# Patient Record
Sex: MALE | Race: WHITE | NOT HISPANIC OR LATINO | Employment: FULL TIME | URBAN - METROPOLITAN AREA
[De-identification: names, ages, dates, MRNs, and addresses within clinical notes are randomized per-mention and may not be internally consistent; named-entity substitution may affect disease eponyms.]

---

## 2017-01-30 ENCOUNTER — GENERIC CONVERSION - ENCOUNTER (OUTPATIENT)
Dept: OTHER | Facility: OTHER | Age: 42
End: 2017-01-30

## 2017-02-06 ENCOUNTER — ALLSCRIPTS OFFICE VISIT (OUTPATIENT)
Dept: OTHER | Facility: OTHER | Age: 42
End: 2017-02-06

## 2017-04-06 ENCOUNTER — LAB (OUTPATIENT)
Dept: LAB | Facility: MEDICAL CENTER | Age: 42
End: 2017-04-06
Attending: PHYSICIAN ASSISTANT

## 2017-04-06 ENCOUNTER — APPOINTMENT (OUTPATIENT)
Dept: URGENT CARE | Facility: MEDICAL CENTER | Age: 42
End: 2017-04-06

## 2017-04-06 ENCOUNTER — TRANSCRIBE ORDERS (OUTPATIENT)
Dept: URGENT CARE | Facility: MEDICAL CENTER | Age: 42
End: 2017-04-06

## 2017-04-06 ENCOUNTER — HOSPITAL ENCOUNTER (OUTPATIENT)
Dept: RADIOLOGY | Facility: MEDICAL CENTER | Age: 42
Discharge: HOME | End: 2017-04-06
Attending: PHYSICIAN ASSISTANT | Admitting: FAMILY MEDICINE

## 2017-04-06 DIAGNOSIS — Z00.8 SPECIAL EXAM: ICD-10-CM

## 2017-04-06 DIAGNOSIS — Z00.8 SPECIAL EXAM: Primary | ICD-10-CM

## 2017-04-06 LAB
ALBUMIN SERPL BCP-MCNC: 3.8 G/DL (ref 3.5–5)
ALP SERPL-CCNC: 66 U/L (ref 46–116)
ALT SERPL W P-5'-P-CCNC: 32 U/L (ref 12–78)
ANION GAP SERPL CALCULATED.3IONS-SCNC: 6 MMOL/L (ref 4–13)
AST SERPL W P-5'-P-CCNC: 16 U/L (ref 5–45)
ATRIAL RATE: 68 BPM
BASOPHILS # BLD AUTO: 0.02 THOUSANDS/ΜL (ref 0–0.1)
BASOPHILS NFR BLD AUTO: 0 % (ref 0–1)
BILIRUB SERPL-MCNC: 0.7 MG/DL (ref 0.2–1)
BUN SERPL-MCNC: 11 MG/DL (ref 5–25)
CALCIUM SERPL-MCNC: 8.8 MG/DL (ref 8.3–10.1)
CHLORIDE SERPL-SCNC: 107 MMOL/L (ref 100–108)
CHOLEST SERPL-MCNC: 155 MG/DL (ref 50–200)
CO2 SERPL-SCNC: 27 MMOL/L (ref 21–32)
CREAT SERPL-MCNC: 1.17 MG/DL (ref 0.6–1.3)
EOSINOPHIL # BLD AUTO: 0.09 THOUSAND/ΜL (ref 0–0.61)
EOSINOPHIL NFR BLD AUTO: 2 % (ref 0–6)
ERYTHROCYTE [DISTWIDTH] IN BLOOD BY AUTOMATED COUNT: 13.2 % (ref 11.6–15.1)
GFR SERPL CREATININE-BSD FRML MDRD: >60 ML/MIN/1.73SQ M
GLUCOSE SERPL-MCNC: 90 MG/DL (ref 65–140)
HCT VFR BLD AUTO: 43.5 % (ref 36.5–49.3)
HDLC SERPL-MCNC: 45 MG/DL (ref 40–60)
HGB BLD-MCNC: 15 G/DL (ref 12–17)
LDLC SERPL CALC-MCNC: 83 MG/DL (ref 0–100)
LYMPHOCYTES # BLD AUTO: 1.77 THOUSANDS/ΜL (ref 0.6–4.47)
LYMPHOCYTES NFR BLD AUTO: 39 % (ref 14–44)
MCH RBC QN AUTO: 30.6 PG (ref 26.8–34.3)
MCHC RBC AUTO-ENTMCNC: 34.5 G/DL (ref 31.4–37.4)
MCV RBC AUTO: 89 FL (ref 82–98)
MONOCYTES # BLD AUTO: 0.64 THOUSAND/ΜL (ref 0.17–1.22)
MONOCYTES NFR BLD AUTO: 14 % (ref 4–12)
NEUTROPHILS # BLD AUTO: 2 THOUSANDS/ΜL (ref 1.85–7.62)
NEUTS SEG NFR BLD AUTO: 45 % (ref 43–75)
NRBC BLD AUTO-RTO: 0 /100 WBCS
P AXIS: 34 DEGREES
PLATELET # BLD AUTO: 169 THOUSANDS/UL (ref 149–390)
PMV BLD AUTO: 11 FL (ref 8.9–12.7)
POTASSIUM SERPL-SCNC: 4.3 MMOL/L (ref 3.5–5.3)
PR INTERVAL: 144 MS
PROT SERPL-MCNC: 6.9 G/DL (ref 6.4–8.2)
QRS AXIS: 23 DEGREES
QRSD INTERVAL: 86 MS
QT INTERVAL: 416 MS
QTC INTERVAL: 442 MS
RBC # BLD AUTO: 4.9 MILLION/UL (ref 3.88–5.62)
SODIUM SERPL-SCNC: 140 MMOL/L (ref 136–145)
T WAVE AXIS: 28 DEGREES
TRIGL SERPL-MCNC: 135 MG/DL
VENTRICULAR RATE: 68 BPM
WBC # BLD AUTO: 4.54 THOUSAND/UL (ref 4.31–10.16)

## 2017-04-06 PROCEDURE — 93005 ELECTROCARDIOGRAM TRACING: CPT

## 2017-04-06 PROCEDURE — 36415 COLL VENOUS BLD VENIPUNCTURE: CPT

## 2017-04-06 PROCEDURE — 85025 COMPLETE CBC W/AUTO DIFF WBC: CPT

## 2017-04-06 PROCEDURE — 80061 LIPID PANEL: CPT

## 2017-04-06 PROCEDURE — 80053 COMPREHEN METABOLIC PANEL: CPT

## 2017-04-06 PROCEDURE — 71010 HB CHEST X-RAY 1 VIEW FRONTAL: CPT

## 2017-08-24 ENCOUNTER — TRANSCRIBE ORDERS (OUTPATIENT)
Dept: ADMINISTRATIVE | Facility: HOSPITAL | Age: 42
End: 2017-08-24

## 2017-08-24 DIAGNOSIS — R52 PAIN: Primary | ICD-10-CM

## 2017-09-05 ENCOUNTER — HOSPITAL ENCOUNTER (OUTPATIENT)
Dept: MRI IMAGING | Facility: HOSPITAL | Age: 42
Discharge: HOME | End: 2017-09-05
Attending: INTERNAL MEDICINE
Payer: COMMERCIAL

## 2017-09-05 DIAGNOSIS — R52 PAIN: ICD-10-CM

## 2017-09-05 PROCEDURE — 72148 MRI LUMBAR SPINE W/O DYE: CPT

## 2017-09-20 ENCOUNTER — HOSPITAL ENCOUNTER (EMERGENCY)
Facility: HOSPITAL | Age: 42
Discharge: HOME | End: 2017-09-20
Attending: EMERGENCY MEDICINE | Admitting: EMERGENCY MEDICINE
Payer: COMMERCIAL

## 2017-09-20 VITALS
RESPIRATION RATE: 16 BRPM | HEART RATE: 85 BPM | OXYGEN SATURATION: 96 % | WEIGHT: 315 LBS | SYSTOLIC BLOOD PRESSURE: 138 MMHG | DIASTOLIC BLOOD PRESSURE: 81 MMHG | TEMPERATURE: 98.3 F

## 2017-09-20 DIAGNOSIS — S83.8X2A ACUTE MEDIAL MENISCAL INJURY OF LEFT KNEE, INITIAL ENCOUNTER: Primary | ICD-10-CM

## 2017-09-20 PROCEDURE — 99283 EMERGENCY DEPT VISIT LOW MDM: CPT

## 2017-09-20 RX ORDER — HYDROCODONE BITARTRATE AND ACETAMINOPHEN 5; 325 MG/1; MG/1
1 TABLET ORAL EVERY 8 HOURS PRN
Qty: 15 TABLET | Refills: 0 | Status: SHIPPED | OUTPATIENT
Start: 2017-09-20 | End: 2017-10-28

## 2017-09-20 RX ORDER — HYDROCODONE BITARTRATE AND ACETAMINOPHEN 5; 325 MG/1; MG/1
1 TABLET ORAL ONCE
Status: COMPLETED | OUTPATIENT
Start: 2017-09-20 | End: 2017-09-20

## 2017-09-20 RX ADMIN — HYDROCODONE BITARTRATE AND ACETAMINOPHEN 1 TABLET: 5; 325 TABLET ORAL at 18:11

## 2017-10-28 ENCOUNTER — APPOINTMENT (EMERGENCY)
Dept: RADIOLOGY | Facility: HOSPITAL | Age: 42
End: 2017-10-28
Payer: COMMERCIAL

## 2017-10-28 ENCOUNTER — HOSPITAL ENCOUNTER (EMERGENCY)
Facility: HOSPITAL | Age: 42
Discharge: HOME | End: 2017-10-28
Attending: EMERGENCY MEDICINE | Admitting: EMERGENCY MEDICINE
Payer: COMMERCIAL

## 2017-10-28 VITALS
WEIGHT: 315 LBS | DIASTOLIC BLOOD PRESSURE: 76 MMHG | HEART RATE: 76 BPM | RESPIRATION RATE: 16 BRPM | OXYGEN SATURATION: 98 % | SYSTOLIC BLOOD PRESSURE: 137 MMHG | BODY MASS INDEX: 36.45 KG/M2 | TEMPERATURE: 98.4 F | HEIGHT: 78 IN

## 2017-10-28 DIAGNOSIS — K52.9 COLITIS: ICD-10-CM

## 2017-10-28 DIAGNOSIS — R10.9 ABDOMINAL PAIN: Primary | ICD-10-CM

## 2017-10-28 LAB
ALBUMIN SERPL BCP-MCNC: 3 G/DL (ref 3.5–5)
ALP SERPL-CCNC: 120 U/L (ref 46–116)
ALT SERPL W P-5'-P-CCNC: 74 U/L (ref 12–78)
ANION GAP SERPL CALCULATED.3IONS-SCNC: 7 MMOL/L (ref 4–13)
APTT PPP: 29 SECONDS (ref 23–35)
AST SERPL W P-5'-P-CCNC: 37 U/L (ref 5–45)
BASOPHILS # BLD AUTO: 0 THOUSANDS/ΜL (ref 0–0.1)
BASOPHILS NFR BLD AUTO: 1 % (ref 0–1)
BILIRUB SERPL-MCNC: 0.6 MG/DL (ref 0.2–1)
BUN SERPL-MCNC: 9 MG/DL (ref 5–25)
CALCIUM SERPL-MCNC: 8.5 MG/DL (ref 8.3–10.1)
CHLORIDE SERPL-SCNC: 104 MMOL/L (ref 100–108)
CO2 SERPL-SCNC: 26 MMOL/L (ref 21–32)
CREAT SERPL-MCNC: 1.21 MG/DL (ref 0.6–1.3)
EOSINOPHIL # BLD AUTO: 0 THOUSAND/ΜL (ref 0–0.61)
EOSINOPHIL NFR BLD AUTO: 1 % (ref 0–6)
ERYTHROCYTE [DISTWIDTH] IN BLOOD BY AUTOMATED COUNT: 13 % (ref 11.6–15.1)
FLUAV AG SPEC QL: NORMAL
FLUBV AG SPEC QL: NORMAL
GFR SERPL CREATININE-BSD FRML MDRD: 73 ML/MIN/1.73SQ M
GLUCOSE SERPL-MCNC: 137 MG/DL (ref 65–140)
HCT VFR BLD AUTO: 41.2 % (ref 42–52)
HGB BLD-MCNC: 13.9 G/DL (ref 14–18)
INR PPP: 1.06 (ref 0.86–1.16)
LACTATE SERPL-SCNC: 0.9 MMOL/L (ref 0.5–2)
LIPASE SERPL-CCNC: 106 U/L (ref 73–393)
LYMPHOCYTES # BLD AUTO: 0.7 THOUSANDS/ΜL (ref 0.6–4.47)
LYMPHOCYTES NFR BLD AUTO: 14 % (ref 14–44)
MCH RBC QN AUTO: 30.1 PG (ref 27–31)
MCHC RBC AUTO-ENTMCNC: 33.7 G/DL (ref 31.4–37.4)
MCV RBC AUTO: 89 FL (ref 82–98)
MONOCYTES # BLD AUTO: 0.3 THOUSAND/ΜL (ref 0.17–1.22)
MONOCYTES NFR BLD AUTO: 7 % (ref 4–12)
NEUTROPHILS # BLD AUTO: 3.6 THOUSANDS/ΜL (ref 1.85–7.62)
NEUTS SEG NFR BLD AUTO: 78 % (ref 43–75)
NRBC BLD AUTO-RTO: 0 /100 WBCS
PLATELET # BLD AUTO: 148 THOUSANDS/UL (ref 130–400)
PMV BLD AUTO: 8.1 FL (ref 8.9–12.7)
POTASSIUM SERPL-SCNC: 3.3 MMOL/L (ref 3.5–5.3)
PROT SERPL-MCNC: 6.5 G/DL (ref 6.4–8.2)
PROTHROMBIN TIME: 11.1 SECONDS (ref 9.4–11.7)
RBC # BLD AUTO: 4.61 MILLION/UL (ref 4.7–6.1)
RSV B RNA SPEC QL NAA+PROBE: NORMAL
SODIUM SERPL-SCNC: 137 MMOL/L (ref 136–145)
WBC # BLD AUTO: 4.6 THOUSAND/UL (ref 4.8–10.8)

## 2017-10-28 PROCEDURE — 85025 COMPLETE CBC W/AUTO DIFF WBC: CPT | Performed by: EMERGENCY MEDICINE

## 2017-10-28 PROCEDURE — 85730 THROMBOPLASTIN TIME PARTIAL: CPT | Performed by: EMERGENCY MEDICINE

## 2017-10-28 PROCEDURE — 83605 ASSAY OF LACTIC ACID: CPT | Performed by: EMERGENCY MEDICINE

## 2017-10-28 PROCEDURE — 80053 COMPREHEN METABOLIC PANEL: CPT | Performed by: EMERGENCY MEDICINE

## 2017-10-28 PROCEDURE — 96361 HYDRATE IV INFUSION ADD-ON: CPT

## 2017-10-28 PROCEDURE — 93005 ELECTROCARDIOGRAM TRACING: CPT | Performed by: EMERGENCY MEDICINE

## 2017-10-28 PROCEDURE — 87798 DETECT AGENT NOS DNA AMP: CPT | Performed by: EMERGENCY MEDICINE

## 2017-10-28 PROCEDURE — 99284 EMERGENCY DEPT VISIT MOD MDM: CPT

## 2017-10-28 PROCEDURE — 83690 ASSAY OF LIPASE: CPT | Performed by: EMERGENCY MEDICINE

## 2017-10-28 PROCEDURE — 74177 CT ABD & PELVIS W/CONTRAST: CPT

## 2017-10-28 PROCEDURE — 96374 THER/PROPH/DIAG INJ IV PUSH: CPT

## 2017-10-28 PROCEDURE — 85610 PROTHROMBIN TIME: CPT | Performed by: EMERGENCY MEDICINE

## 2017-10-28 PROCEDURE — 36415 COLL VENOUS BLD VENIPUNCTURE: CPT | Performed by: EMERGENCY MEDICINE

## 2017-10-28 RX ORDER — CARISOPRODOL 350 MG/1
350 TABLET ORAL 2 TIMES DAILY PRN
COMMUNITY

## 2017-10-28 RX ORDER — BUTALBITAL, ACETAMINOPHEN AND CAFFEINE 50; 325; 40 MG/1; MG/1; MG/1
1 TABLET ORAL EVERY 6 HOURS PRN
Qty: 15 TABLET | Refills: 0 | Status: SHIPPED | OUTPATIENT
Start: 2017-10-28 | End: 2018-07-27

## 2017-10-28 RX ORDER — BUTALBITAL, ACETAMINOPHEN AND CAFFEINE 50; 325; 40 MG/1; MG/1; MG/1
1 TABLET ORAL ONCE
Status: COMPLETED | OUTPATIENT
Start: 2017-10-28 | End: 2017-10-28

## 2017-10-28 RX ORDER — METRONIDAZOLE 500 MG/1
500 TABLET ORAL EVERY 8 HOURS SCHEDULED
Qty: 30 TABLET | Refills: 0 | Status: SHIPPED | OUTPATIENT
Start: 2017-10-28 | End: 2017-11-07

## 2017-10-28 RX ORDER — CIPROFLOXACIN 500 MG/1
500 TABLET, FILM COATED ORAL EVERY 12 HOURS SCHEDULED
Qty: 20 TABLET | Refills: 0 | Status: SHIPPED | OUTPATIENT
Start: 2017-10-28 | End: 2017-11-07

## 2017-10-28 RX ORDER — METRONIDAZOLE 500 MG/1
500 TABLET ORAL ONCE
Status: COMPLETED | OUTPATIENT
Start: 2017-10-28 | End: 2017-10-28

## 2017-10-28 RX ORDER — CIPROFLOXACIN 500 MG/1
500 TABLET, FILM COATED ORAL ONCE
Status: COMPLETED | OUTPATIENT
Start: 2017-10-28 | End: 2017-10-28

## 2017-10-28 RX ORDER — KETOROLAC TROMETHAMINE 30 MG/ML
30 INJECTION, SOLUTION INTRAMUSCULAR; INTRAVENOUS ONCE
Status: COMPLETED | OUTPATIENT
Start: 2017-10-28 | End: 2017-10-28

## 2017-10-28 RX ADMIN — CIPROFLOXACIN 500 MG: 500 TABLET, FILM COATED ORAL at 06:36

## 2017-10-28 RX ADMIN — IOHEXOL 100 ML: 350 INJECTION, SOLUTION INTRAVENOUS at 06:13

## 2017-10-28 RX ADMIN — METRONIDAZOLE 500 MG: 500 TABLET ORAL at 06:36

## 2017-10-28 RX ADMIN — KETOROLAC TROMETHAMINE 30 MG: 30 INJECTION, SOLUTION INTRAMUSCULAR at 05:31

## 2017-10-28 RX ADMIN — BUTALBITAL, ACETAMINOPHEN, AND CAFFEINE 1 TABLET: 50; 325; 40 TABLET ORAL at 06:35

## 2017-10-28 RX ADMIN — SODIUM CHLORIDE 1000 ML: 0.9 INJECTION, SOLUTION INTRAVENOUS at 05:31

## 2017-10-28 NOTE — ED PROVIDER NOTES
History  Chief Complaint   Patient presents with    Fever - 9 weeks to 74 years     from Thursday until last night  Spiked 104    Dizziness     started Thurday    Diarrhea     Since last night, multiple bouts of liquid stool    Vomiting     once tonight    Headache     since last Tuesday, unrelieved by medication    Generalized Body Aches    Rash     appeared on lower back and belly with fever, since resolved       History provided by:  Patient  Fever - 9 weeks to 74 years   Temp source:  Oral  Severity:  Moderate  Onset quality:  Gradual  Timing:  Constant  Progression:  Worsening  Chronicity:  New  Relieved by:  Nothing  Worsened by:  Nothing  Ineffective treatments:  None tried  Associated symptoms: diarrhea, headaches, myalgias, nausea and sore throat    Associated symptoms: no chest pain, no chills, no confusion, no cough, no dysuria, no rash and no rhinorrhea        Prior to Admission Medications   Prescriptions Last Dose Informant Patient Reported? Taking? Pantoprazole Sodium (PROTONIX PO) 10/27/2017 at Unknown time  Yes Yes   Sig: Take 40 mg by mouth daily   carisoprodol (SOMA) 350 mg tablet Past Week at Unknown time  Yes Yes   Sig: Take 350 mg by mouth every 6 (six) hours as needed for muscle spasms      Facility-Administered Medications: None       Past Medical History:   Diagnosis Date    Atrial fibrillation (HCC)     Chronic pain     back pain    GERD (gastroesophageal reflux disease)        Past Surgical History:   Procedure Laterality Date    ANKLE SURGERY Right     APPENDECTOMY      CHOLECYSTECTOMY      KNEE ARTHROPLASTY         History reviewed  No pertinent family history  I have reviewed and agree with the history as documented  Social History   Substance Use Topics    Smoking status: Never Smoker    Smokeless tobacco: Current User    Alcohol use No        Review of Systems   Constitutional: Positive for fever  Negative for chills  HENT: Positive for sore throat   Negative for rhinorrhea and trouble swallowing  Eyes: Negative for pain  Respiratory: Negative for cough, shortness of breath, wheezing and stridor  Cardiovascular: Negative for chest pain and leg swelling  Gastrointestinal: Positive for diarrhea and nausea  Negative for abdominal pain  Endocrine: Negative for polyuria  Genitourinary: Negative for dysuria, flank pain and urgency  Musculoskeletal: Positive for myalgias  Negative for joint swelling and neck stiffness  Skin: Negative for rash  Allergic/Immunologic: Negative for immunocompromised state  Neurological: Positive for headaches  Negative for dizziness, syncope, weakness and numbness  Psychiatric/Behavioral: Negative for confusion and suicidal ideas  All other systems reviewed and are negative  Physical Exam  ED Triage Vitals [10/28/17 0459]   Temperature Pulse Respirations Blood Pressure SpO2   98 4 °F (36 9 °C) 76 16 137/76 98 %      Temp Source Heart Rate Source Patient Position - Orthostatic VS BP Location FiO2 (%)   Tympanic Monitor Lying Right arm --      Pain Score       8           Orthostatic Vital Signs  Vitals:    10/28/17 0459   BP: 137/76   Pulse: 76   Patient Position - Orthostatic VS: Lying       Physical Exam   Constitutional: He is oriented to person, place, and time  He appears well-developed and well-nourished  HENT:   Head: Normocephalic and atraumatic  Eyes: EOM are normal  Pupils are equal, round, and reactive to light  Neck: Normal range of motion  Neck supple  No spinous process tenderness present  No neck rigidity  Normal range of motion present  No Brudzinski's sign and no Kernig's sign noted  Cardiovascular: Normal rate and regular rhythm  Exam reveals no friction rub  No murmur heard  Pulmonary/Chest: Breath sounds normal  No respiratory distress  He has no wheezes  He has no rales  Abdominal: Soft  Bowel sounds are normal  He exhibits no distension  There is generalized tenderness  Musculoskeletal: Normal range of motion  He exhibits no edema or tenderness  Neurological: He is alert and oriented to person, place, and time  Skin: Skin is warm  No rash noted  Psychiatric: He has a normal mood and affect  Nursing note and vitals reviewed  ED Medications  Medications   butalbital-acetaminophen-caffeine (FIORICET,ESGIC) -40 mg per tablet 1 tablet (not administered)   metroNIDAZOLE (FLAGYL) tablet 500 mg (not administered)   ciprofloxacin (CIPRO) tablet 500 mg (not administered)   sodium chloride 0 9 % bolus 1,000 mL (1,000 mL Intravenous New Bag 10/28/17 0531)   ketorolac (TORADOL) 30 mg/mL injection 30 mg (30 mg Intravenous Given 10/28/17 0531)   iohexol (OMNIPAQUE) 350 MG/ML injection (SINGLE-DOSE) 100 mL (100 mL Intravenous Given 10/28/17 0613)       Diagnostic Studies  Results Reviewed     Procedure Component Value Units Date/Time    Protime-INR [[id number]]  (Normal) Collected:  10/28/17 0518    Lab Status:  Final result Specimen:  Blood from Arm, Right Updated:  10/28/17 0556     Protime 11 1 seconds      INR 1 06    APTT [[id number]]  (Normal) Collected:  10/28/17 0518    Lab Status:  Final result Specimen:  Blood from Arm, Right Updated:  10/28/17 0556     PTT 29 seconds     Narrative: Therapeutic Heparin Range = 60-90 seconds    Lactic acid, plasma [[id number]]  (Normal) Collected:  10/28/17 0518    Lab Status:  Final result Specimen:  Blood from Arm, Right Updated:  10/28/17 0553     LACTIC ACID 0 9 mmol/L     Narrative:         Result may be elevated if tourniquet was used during collection      Comprehensive metabolic panel [[id number]]  (Abnormal) Collected:  10/28/17 0518    Lab Status:  Final result Specimen:  Blood from Arm, Right Updated:  10/28/17 0548     Sodium 137 mmol/L      Potassium 3 3 (L) mmol/L      Chloride 104 mmol/L      CO2 26 mmol/L      Anion Gap 7 mmol/L      BUN 9 mg/dL      Creatinine 1 21 mg/dL      Glucose 137 mg/dL      Calcium 8 5 mg/dL AST 37 U/L      ALT 74 U/L      Alkaline Phosphatase 120 (H) U/L      Total Protein 6 5 g/dL      Albumin 3 0 (L) g/dL      Total Bilirubin 0 60 mg/dL      eGFR 73 ml/min/1 73sq m     Narrative:         National Kidney Disease Education Program recommendations are as follows:  GFR calculation is accurate only with a steady state creatinine  Chronic Kidney disease less than 60 ml/min/1 73 sq  meters  Kidney failure less than 15 ml/min/1 73 sq  meters  Lipase [[id number]]  (Normal) Collected:  10/28/17 0518    Lab Status:  Final result Specimen:  Blood from Arm, Right Updated:  10/28/17 0548     Lipase 106 u/L     Influenza A/B and RSV by PCR (indicated for patients >2 mo of age) [[id number]] Collected:  10/28/17 0521    Lab Status: In process Specimen:  Nasopharyngeal from Nasopharyngeal Swab Updated:  10/28/17 0524    CBC and differential [[id number]]  (Abnormal) Collected:  10/28/17 0518    Lab Status:  Final result Specimen:  Blood from Arm, Right Updated:  10/28/17 0524     WBC 4 60 (L) Thousand/uL      RBC 4 61 (L) Million/uL      Hemoglobin 13 9 (L) g/dL      Hematocrit 41 2 (L) %      MCV 89 fL      MCH 30 1 pg      MCHC 33 7 g/dL      RDW 13 0 %      MPV 8 1 (L) fL      Platelets 104 Thousands/uL      nRBC 0 /100 WBCs      Neutrophils Relative 78 (H) %      Lymphocytes Relative 14 %      Monocytes Relative 7 %      Eosinophils Relative 1 %      Basophils Relative 1 %      Neutrophils Absolute 3 60 Thousands/µL      Lymphocytes Absolute 0 70 Thousands/µL      Monocytes Absolute 0 30 Thousand/µL      Eosinophils Absolute 0 00 Thousand/µL      Basophils Absolute 0 00 Thousands/µL     POCT urinalysis dipstick [[id number]]     Lab Status:  No result Specimen:  Urine                  CT abdomen pelvis with contrast   Final Result by Yelena George MD (10/28 7444)      Mural thickening involving the ascending colon consistent with an infectious or inflammatory colitis           Workstation performed: [id number] Procedures  ECG 12 Lead Documentation  Date/Time: 10/28/2017 5:06 AM  Performed by: Luke Ayala  Authorized by: Luke Ayala     ECG reviewed by me, the ED Provider: yes    Patient location:  ED  Previous ECG:     Previous ECG:  Compared to current    Similarity:  No change  Interpretation:     Interpretation: normal    Rate:     ECG rate assessment: normal    Rhythm:     Rhythm: sinus rhythm    Ectopy:     Ectopy: none    QRS:     QRS axis:  Normal    QRS intervals:  Normal  Conduction:     Conduction: normal    ST segments:     ST segments:  Normal  T waves:     T waves: normal               Phone Contacts  ED Phone Contact    ED Course  ED Course                                MDM  Number of Diagnoses or Management Options  Abdominal pain: new and requires workup  Colitis: new and requires workup  Diagnosis management comments: 24-year-old male presents emergency department noted intermittent fevers as well as generalized malaise and myalgias  The patient has also intermittent diarrhea over the last several days associated with this he has been taking left over amoxicillin with no improvement in the symptoms  The family members also had similar symptoms with GI illness noted with diarrhea  Here in the emergency department differential diagnosis includes colitis, infectious diverticulitis, viral illness, influenza, blood work and CT scan to be performed at this point time medication to be given for pain control  6:32 AM  CT scan noted with diffuse colitis and ascending colitis  Infectious at this point time will treat with Cipro Flagyl  Patient also complaining of a headache at this point time no nuchal rigidity or evidence of meningitis  Treat with Fioricet at this point time for the symptoms of the headache  Plan outpatient management followup follow up PCP given strict instructions when to return back to the emergency department      Pt re-examined and evaluated after testing and treatment  Spoke with the patient and feeling improved and sxs have resolved  Will discharge home with close f/u with pcp and instructed to return to the ED if sxs worsen or continue  Pt agrees with the plan for discharge and feels comfortable to go home with proper f/u  Advised to return for worsening or additional problems  Diagnostic tests were reviewed and questions answered  Diagnosis, care plan and treatment options were discussed  The patient understand instructions and will follow up as directed  Amount and/or Complexity of Data Reviewed  Clinical lab tests: reviewed and ordered  Tests in the radiology section of CPT®: reviewed and ordered      CritCare Time    Disposition  Final diagnoses:   Abdominal pain   Colitis     Time reflects when diagnosis was documented in both MDM as applicable and the Disposition within this note     Time User Action Codes Description Comment    10/28/2017  6:30 AM Zula Fore Add [R10 9] Abdominal pain     10/28/2017  6:30 AM Zula Fore Add [K52 9] Colitis       ED Disposition     ED Disposition Condition Comment    Discharge  Janie Ramey discharge to home/self care  Condition at discharge: Stable        Follow-up Information     Follow up With Specialties Details Why [address] Aleksey Fernandez MD Internal Medicine Call in 2 days If symptoms worsen [address] [facility]  [address]  [address] [facility] [place] [address]  [phone]          Patient's Medications   Discharge Prescriptions    BUTALBITAL-ACETAMINOPHEN-CAFFEINE (FIORICET,ESGIC) -40 MG PER TABLET    Take 1 tablet by mouth every 6 (six) hours as needed for headaches       Start Date: 10/28/2017End Date: --       Order Dose: 1 tablet       Quantity: 15 tablet    Refills: 0    CIPROFLOXACIN (CIPRO) 500 MG TABLET    Take 1 tablet by mouth every 12 (twelve) hours for 10 days       Start Date: 10/28/2017End Date: 11/7/2017       Order Dose: 500 mg       Quantity: 20 tablet    Refills: 0 METRONIDAZOLE (FLAGYL) 500 MG TABLET    Take 1 tablet by mouth every 8 (eight) hours for 10 days       Start Date: 10/28/2017End Date: 11/7/2017       Order Dose: 500 mg       Quantity: 30 tablet    Refills: 0     No discharge procedures on file      ED Provider  Electronically Signed by           Haroldo Huynh DO  10/28/17 2115

## 2017-10-28 NOTE — DISCHARGE INSTRUCTIONS
Abdominal Pain, Ambulatory Care   GENERAL INFORMATION:   Abdominal pain  can be dull, achy, or sharp  You may have pain in one area of your abdomen, or in your entire abdomen  Your pain may be caused by constipation, food sensitivity or poisoning, infection, or a blockage  Abdominal pain can also be caused by a hernia, appendicitis, or an ulcer  The cause of your abdominal pain may be unknown  Seek immediate care for the following symptoms:   · New chest pain or shortness of breath    · Pulsing pain in your upper abdomen or lower back that suddenly becomes constant    · Pain in the right lower abdominal area that worsens with movement    · Fever over 100 4°F (38°C) or shaking chills    · Vomiting and you cannot keep food or fluids down    · Pain does not improve or gets worse over the next 8 to 12 hours    · Blood in your vomit or bowel movements, or they look black and tarry    · Skin or the whites of your eyes turn yellow    · Large amount of vaginal bleeding that is not your monthly period  Treatment for abdominal pain  may include medicine to calm your stomach, prevent vomiting, or decrease pain  Follow up with your healthcare provider as directed:  Write down your questions so you remember to ask them during your visits  CARE AGREEMENT:   You have the right to help plan your care  Learn about your health condition and how it may be treated  Discuss treatment options with your caregivers to decide what care you want to receive  You always have the right to refuse treatment  The above information is an  only  It is not intended as medical advice for individual conditions or treatments  Talk to your doctor, nurse or pharmacist before following any medical regimen to see if it is safe and effective for you  © 2014 [address] is for End User's use only and may not be sold, redistributed or otherwise used for commercial purposes   All illustrations and images included in [facility] are the copyrighted property of [employer]  or [employer]  Colitis   WHAT YOU NEED TO KNOW:   Colitis is swelling and irritation of your colon  Colitis may be caused by ulcers or a problem with your immune system  Bacteria, a virus, or a parasite may also cause colitis  The cause may not be known  You may have diarrhea, abdominal pain, fever, or blood or mucus in your bowel movement  DISCHARGE INSTRUCTIONS:   Return to the emergency department if:   · You have sudden trouble breathing  · Your bowel movements are black or have blood in them  · You have blood in your vomit  · You have severe abdominal pain or your abdomen is swollen and feels hard  · You have any of the following signs of dehydration:     ¨ Dizziness or weakness    ¨ Dry mouth, cracked lips, or severe thirst    ¨ Fast heartbeat or breathing    ¨ Urinating very little or not at all  Contact your healthcare provider if:   · Your symptoms get worse or do not go away  · You have a fever, chills, cough, or feel weak and achy  · You suddenly lose weight without trying  · You have questions or concerns about your condition or care  Medicines:   · Medicines  may be given to decrease inflammation in your colon and treat diarrhea  · Take your medicine as directed  Contact your healthcare provider if you think your medicine is not helping or if you have side effects  Tell him of her if you are allergic to any medicine  Keep a list of the medicines, vitamins, and herbs you take  Include the amounts, and when and why you take them  Bring the list or the pill bottles to follow-up visits  Carry your medicine list with you in case of an emergency  Manage your symptoms:   · Drink liquids as directed  to help prevent dehydration  Good liquids to drink include water, juice, and broth  Ask how much liquid to drink each day  You may need to drink an oral rehydration solution (ORS)   An ORS contains a balance of water, salt, and sugar to replace body fluids lost during diarrhea  · Eat a variety of healthy foods  Healthy foods include fruits, vegetables, whole-grain breads, beans, low-fat dairy products, lean meats, and fish  You may need to eat several small meals throughout the day instead of large meals  Avoid spicy foods, caffeine, chocolate, and foods high in fat  · Talk to your healthcare provider before you take NSAIDs  NSAIDs can cause worsen your symptoms if ulcers are causing your colitis  · Start to exercise when you feel better  Regular exercise helps your bowels work normally  Ask about the best exercise plan for you  Follow up with your healthcare provider as directed: You may need to return for a colonoscopy or other tests  Write down how often you have a bowel movements and what they look like  Bring this to your follow-up visits  Write down your questions so you remember to ask them during your visits  © 2017 [address] Information is for End User's use only and may not be sold, redistributed or otherwise used for commercial purposes  All illustrations and images included in CareNotes® are the copyrighted property of [employer]  or [address]  The above information is an  only  It is not intended as medical advice for individual conditions or treatments  Talk to your doctor, nurse or pharmacist before following any medical regimen to see if it is safe and effective for you

## 2017-10-30 ENCOUNTER — HOSPITAL ENCOUNTER (EMERGENCY)
Facility: HOSPITAL | Age: 42
Discharge: HOME | End: 2017-10-30
Attending: EMERGENCY MEDICINE | Admitting: EMERGENCY MEDICINE
Payer: COMMERCIAL

## 2017-10-30 VITALS
OXYGEN SATURATION: 95 % | TEMPERATURE: 97.8 F | DIASTOLIC BLOOD PRESSURE: 64 MMHG | RESPIRATION RATE: 18 BRPM | HEART RATE: 60 BPM | BODY MASS INDEX: 36.45 KG/M2 | WEIGHT: 315 LBS | SYSTOLIC BLOOD PRESSURE: 124 MMHG | HEIGHT: 78 IN

## 2017-10-30 DIAGNOSIS — K52.9 COLITIS: ICD-10-CM

## 2017-10-30 DIAGNOSIS — R10.9 ABDOMINAL PAIN: Primary | ICD-10-CM

## 2017-10-30 LAB
ALBUMIN SERPL BCP-MCNC: 3.2 G/DL (ref 3.5–5)
ALP SERPL-CCNC: 119 U/L (ref 46–116)
ALT SERPL W P-5'-P-CCNC: 59 U/L (ref 12–78)
ANION GAP SERPL CALCULATED.3IONS-SCNC: 7 MMOL/L (ref 4–13)
AST SERPL W P-5'-P-CCNC: 27 U/L (ref 5–45)
ATRIAL RATE: 76 BPM
BASOPHILS # BLD AUTO: 0 THOUSANDS/ΜL (ref 0–0.1)
BASOPHILS NFR BLD AUTO: 1 % (ref 0–1)
BILIRUB SERPL-MCNC: 0.4 MG/DL (ref 0.2–1)
BILIRUB UR QL STRIP: NEGATIVE
BUN SERPL-MCNC: 7 MG/DL (ref 5–25)
CALCIUM SERPL-MCNC: 9 MG/DL (ref 8.3–10.1)
CHLORIDE SERPL-SCNC: 106 MMOL/L (ref 100–108)
CLARITY UR: CLEAR
CO2 SERPL-SCNC: 29 MMOL/L (ref 21–32)
COLOR UR: YELLOW
CREAT SERPL-MCNC: 1.21 MG/DL (ref 0.6–1.3)
EOSINOPHIL # BLD AUTO: 0.1 THOUSAND/ΜL (ref 0–0.61)
EOSINOPHIL NFR BLD AUTO: 3 % (ref 0–6)
ERYTHROCYTE [DISTWIDTH] IN BLOOD BY AUTOMATED COUNT: 13.4 % (ref 11.6–15.1)
GFR SERPL CREATININE-BSD FRML MDRD: 73 ML/MIN/1.73SQ M
GLUCOSE SERPL-MCNC: 132 MG/DL (ref 65–140)
GLUCOSE UR STRIP-MCNC: NEGATIVE MG/DL
HCT VFR BLD AUTO: 40.2 % (ref 42–52)
HGB BLD-MCNC: 13.5 G/DL (ref 14–18)
HGB UR QL STRIP.AUTO: NEGATIVE
KETONES UR STRIP-MCNC: NEGATIVE MG/DL
LACTATE SERPL-SCNC: 1.1 MMOL/L (ref 0.5–2)
LEUKOCYTE ESTERASE UR QL STRIP: NEGATIVE
LIPASE SERPL-CCNC: 286 U/L (ref 73–393)
LYMPHOCYTES # BLD AUTO: 1.5 THOUSANDS/ΜL (ref 0.6–4.47)
LYMPHOCYTES NFR BLD AUTO: 44 % (ref 14–44)
MCH RBC QN AUTO: 30 PG (ref 27–31)
MCHC RBC AUTO-ENTMCNC: 33.6 G/DL (ref 31.4–37.4)
MCV RBC AUTO: 89 FL (ref 82–98)
MONOCYTES # BLD AUTO: 0.2 THOUSAND/ΜL (ref 0.17–1.22)
MONOCYTES NFR BLD AUTO: 7 % (ref 4–12)
NEUTROPHILS # BLD AUTO: 1.6 THOUSANDS/ΜL (ref 1.85–7.62)
NEUTS SEG NFR BLD AUTO: 45 % (ref 43–75)
NITRITE UR QL STRIP: NEGATIVE
NRBC BLD AUTO-RTO: 0 /100 WBCS
P AXIS: 44 DEGREES
PH UR STRIP.AUTO: 7 [PH] (ref 5–9)
PLATELET # BLD AUTO: 171 THOUSANDS/UL (ref 130–400)
PMV BLD AUTO: 8.1 FL (ref 8.9–12.7)
POTASSIUM SERPL-SCNC: 3.3 MMOL/L (ref 3.5–5.3)
PR INTERVAL: 132 MS
PROT SERPL-MCNC: 6.9 G/DL (ref 6.4–8.2)
PROT UR STRIP-MCNC: NEGATIVE MG/DL
QRS AXIS: 18 DEGREES
QRSD INTERVAL: 90 MS
QT INTERVAL: 384 MS
QTC INTERVAL: 432 MS
RBC # BLD AUTO: 4.5 MILLION/UL (ref 4.7–6.1)
SODIUM SERPL-SCNC: 142 MMOL/L (ref 136–145)
SP GR UR STRIP.AUTO: <=1.005 (ref 1–1.03)
T WAVE AXIS: 24 DEGREES
UROBILINOGEN UR QL STRIP.AUTO: 0.2 E.U./DL
VENTRICULAR RATE: 76 BPM
WBC # BLD AUTO: 3.5 THOUSAND/UL (ref 4.8–10.8)

## 2017-10-30 PROCEDURE — 81003 URINALYSIS AUTO W/O SCOPE: CPT | Performed by: EMERGENCY MEDICINE

## 2017-10-30 PROCEDURE — 83605 ASSAY OF LACTIC ACID: CPT | Performed by: EMERGENCY MEDICINE

## 2017-10-30 PROCEDURE — 80053 COMPREHEN METABOLIC PANEL: CPT | Performed by: EMERGENCY MEDICINE

## 2017-10-30 PROCEDURE — 96375 TX/PRO/DX INJ NEW DRUG ADDON: CPT

## 2017-10-30 PROCEDURE — 96374 THER/PROPH/DIAG INJ IV PUSH: CPT

## 2017-10-30 PROCEDURE — 83690 ASSAY OF LIPASE: CPT | Performed by: EMERGENCY MEDICINE

## 2017-10-30 PROCEDURE — 96361 HYDRATE IV INFUSION ADD-ON: CPT

## 2017-10-30 PROCEDURE — 99284 EMERGENCY DEPT VISIT MOD MDM: CPT

## 2017-10-30 PROCEDURE — 85025 COMPLETE CBC W/AUTO DIFF WBC: CPT | Performed by: EMERGENCY MEDICINE

## 2017-10-30 PROCEDURE — 36415 COLL VENOUS BLD VENIPUNCTURE: CPT | Performed by: EMERGENCY MEDICINE

## 2017-10-30 RX ORDER — ONDANSETRON 2 MG/ML
4 INJECTION INTRAMUSCULAR; INTRAVENOUS ONCE
Status: COMPLETED | OUTPATIENT
Start: 2017-10-30 | End: 2017-10-30

## 2017-10-30 RX ORDER — KETOROLAC TROMETHAMINE 30 MG/ML
30 INJECTION, SOLUTION INTRAMUSCULAR; INTRAVENOUS ONCE
Status: COMPLETED | OUTPATIENT
Start: 2017-10-30 | End: 2017-10-30

## 2017-10-30 RX ORDER — POTASSIUM CHLORIDE 20 MEQ/1
40 TABLET, EXTENDED RELEASE ORAL ONCE
Status: COMPLETED | OUTPATIENT
Start: 2017-10-30 | End: 2017-10-30

## 2017-10-30 RX ORDER — NAPROXEN 500 MG/1
500 TABLET ORAL 2 TIMES DAILY WITH MEALS
Qty: 30 TABLET | Refills: 0 | Status: SHIPPED | OUTPATIENT
Start: 2017-10-30 | End: 2018-07-27

## 2017-10-30 RX ORDER — ONDANSETRON 4 MG/1
4 TABLET, FILM COATED ORAL EVERY 6 HOURS
Qty: 12 TABLET | Refills: 0 | Status: SHIPPED | OUTPATIENT
Start: 2017-10-30 | End: 2018-07-27

## 2017-10-30 RX ADMIN — KETOROLAC TROMETHAMINE 30 MG: 30 INJECTION, SOLUTION INTRAMUSCULAR at 09:22

## 2017-10-30 RX ADMIN — POTASSIUM CHLORIDE 40 MEQ: 1500 TABLET, EXTENDED RELEASE ORAL at 09:58

## 2017-10-30 RX ADMIN — FAMOTIDINE 20 MG: 10 INJECTION, SOLUTION INTRAVENOUS at 09:19

## 2017-10-30 RX ADMIN — ONDANSETRON 4 MG: 2 INJECTION INTRAMUSCULAR; INTRAVENOUS at 09:15

## 2017-10-30 RX ADMIN — SODIUM CHLORIDE 1000 ML: 0.9 INJECTION, SOLUTION INTRAVENOUS at 09:58

## 2017-11-02 NOTE — ED PROVIDER NOTES
History  Chief Complaint   Patient presents with    Abdominal Pain     patient c/o abdominal pain - seen here over weekend for same and diagnosed with abdominal pain  History provided by:  Patient   used: No    Abdominal Pain   Pain location:  Generalized (Left>right)  Pain quality: bloating and cramping    Pain radiates to:  Does not radiate  Pain severity:  Moderate  Onset quality:  Gradual  Timing:  Constant  Progression:  Unchanged  Chronicity:  New  Context: alcohol use    Context: not awakening from sleep, not diet changes, not eating, not laxative use, not medication withdrawal, not previous surgeries, not recent illness, not recent sexual activity, not recent travel, not retching, not sick contacts, not suspicious food intake and not trauma    Context comment:  Recently seen in the ER for the same evaluated with labs and CT was showing mild colitis  Relieved by:  Nothing  Worsened by:  Nothing  Ineffective treatments:  None tried  Associated symptoms: anorexia and nausea    Associated symptoms: no belching, no chest pain, no chills, no constipation, no cough, no diarrhea, no dysuria, no fatigue, no fever, no flatus, no hematemesis, no hematochezia, no hematuria, no melena, no shortness of breath, no sore throat and no vomiting    Risk factors: no alcohol abuse, no aspirin use, not elderly, has not had multiple surgeries, no NSAID use, not obese and no recent hospitalization        Prior to Admission Medications   Prescriptions Last Dose Informant Patient Reported? Taking?    Pantoprazole Sodium (PROTONIX PO) 10/29/2017 at Unknown time  Yes Yes   Sig: Take 40 mg by mouth daily   butalbital-acetaminophen-caffeine (FIORICET,ESGIC) -40 mg per tablet   No No   Sig: Take 1 tablet by mouth every 6 (six) hours as needed for headaches   carisoprodol (SOMA) 350 mg tablet Past Week at Unknown time  Yes Yes   Sig: Take 350 mg by mouth every 6 (six) hours as needed for muscle spasms ciprofloxacin (CIPRO) 500 mg tablet 10/30/2017 at Unknown time  No Yes   Sig: Take 1 tablet by mouth every 12 (twelve) hours for 10 days   metroNIDAZOLE (FLAGYL) 500 mg tablet 10/30/2017 at Unknown time  No Yes   Sig: Take 1 tablet by mouth every 8 (eight) hours for 10 days      Facility-Administered Medications: None       Past Medical History:   Diagnosis Date    Atrial fibrillation (HCC)     Chronic pain     back pain    GERD (gastroesophageal reflux disease)        Past Surgical History:   Procedure Laterality Date    ANKLE SURGERY Right     APPENDECTOMY      CHOLECYSTECTOMY      KNEE ARTHROPLASTY         History reviewed  No pertinent family history  I have reviewed and agree with the history as documented  Social History   Substance Use Topics    Smoking status: Never Smoker    Smokeless tobacco: Current User    Alcohol use No        Review of Systems   Constitutional: Positive for appetite change  Negative for chills, diaphoresis, fatigue and fever  HENT: Negative for congestion and sore throat  Eyes: Negative for pain, discharge, redness and itching  Respiratory: Negative for cough, chest tightness and shortness of breath  Cardiovascular: Negative for chest pain and leg swelling  Gastrointestinal: Positive for abdominal pain, anorexia and nausea  Negative for blood in stool, constipation, diarrhea, flatus, hematemesis, hematochezia, melena and vomiting  Endocrine: Negative for polydipsia, polyphagia and polyuria  Genitourinary: Negative for difficulty urinating, dysuria, flank pain and hematuria  Musculoskeletal: Negative for back pain, neck pain and neck stiffness  Skin: Negative for pallor, rash and wound  Allergic/Immunologic: Negative for immunocompromised state  Neurological: Negative for dizziness, light-headedness and headaches         Physical Exam  ED Triage Vitals [10/30/17 0821]   Temperature Pulse Respirations Blood Pressure SpO2   97 8 °F (36 6 °C) 78 18 136/71 97 %      Temp Source Heart Rate Source Patient Position - Orthostatic VS BP Location FiO2 (%)   Oral Monitor Sitting Right arm --      Pain Score       9           Orthostatic Vital Signs  Vitals:    10/30/17 0821 10/30/17 0957 10/30/17 1000   BP: 136/71 124/64 124/64   Pulse: 78 63 60   Patient Position - Orthostatic VS: Sitting Lying        Physical Exam   Constitutional: He is oriented to person, place, and time  He appears well-developed and well-nourished  No distress  HENT:   Head: Normocephalic and atraumatic  Mouth/Throat: Oropharynx is clear and moist    Eyes: EOM are normal  Pupils are equal, round, and reactive to light  No scleral icterus  Neck: Normal range of motion  Neck supple  Cardiovascular: Normal rate, regular rhythm and intact distal pulses  Pulmonary/Chest: Effort normal and breath sounds normal    Abdominal: Soft  He exhibits no distension and no mass  There is tenderness  There is guarding  There is no rebound  No hernia  Mild diffuse tenderness to palpation  Voluntary guarding    Musculoskeletal: Normal range of motion  Neurological: He is alert and oriented to person, place, and time  Skin: Skin is warm and dry  He is not diaphoretic  Nursing note and vitals reviewed        ED Medications  Medications   ketorolac (TORADOL) 30 mg/mL injection 30 mg (30 mg Intravenous Given 10/30/17 0922)   famotidine (PEPCID) injection 20 mg (20 mg Intravenous Given 10/30/17 0919)   ondansetron (ZOFRAN) injection 4 mg (4 mg Intravenous Given 10/30/17 0915)   potassium chloride (K-DUR,KLOR-CON) CR tablet 40 mEq (40 mEq Oral Given 10/30/17 0958)   sodium chloride 0 9 % bolus 1,000 mL (0 mL Intravenous Stopped 10/30/17 1056)       Diagnostic Studies  Results Reviewed     Procedure Component Value Units Date/Time    UA w Reflex to Microscopic w Reflex to Culture [[id number]]  (Normal) Collected:  10/30/17 1024    Lab Status:  Final result Specimen:  Urine from Urine, Clean Catch Updated: 10/30/17 1034     Color, UA Yellow     Clarity, UA Clear     Specific Gravity, UA <=1 005     pH, UA 7 0     Leukocytes, UA Negative     Nitrite, UA Negative     Protein, UA Negative mg/dl      Glucose, UA Negative mg/dl      Ketones, UA Negative mg/dl      Urobilinogen, UA 0 2 E U /dl      Bilirubin, UA Negative     Blood, UA Negative    Comprehensive metabolic panel [[id number]]  (Abnormal) Collected:  10/30/17 0831    Lab Status:  Final result Specimen:  Blood from Arm, Left Updated:  10/30/17 7442     Sodium 142 mmol/L      Potassium 3 3 (L) mmol/L      Chloride 106 mmol/L      CO2 29 mmol/L      Anion Gap 7 mmol/L      BUN 7 mg/dL      Creatinine 1 21 mg/dL      Glucose 132 mg/dL      Calcium 9 0 mg/dL      AST 27 U/L      ALT 59 U/L      Alkaline Phosphatase 119 (H) U/L      Total Protein 6 9 g/dL      Albumin 3 2 (L) g/dL      Total Bilirubin 0 40 mg/dL      eGFR 73 ml/min/1 73sq m     Narrative:         National Kidney Disease Education Program recommendations are as follows:  GFR calculation is accurate only with a steady state creatinine  Chronic Kidney disease less than 60 ml/min/1 73 sq  meters  Kidney failure less than 15 ml/min/1 73 sq  meters  Lipase [[id number]]  (Normal) Collected:  10/30/17 0831    Lab Status:  Final result Specimen:  Blood from Arm, Left Updated:  10/30/17 0917     Lipase 286 u/L     Lactic acid, plasma [[id number]]  (Normal) Collected:  10/30/17 0830    Lab Status:  Final result Specimen:  Blood from Arm, Left Updated:  10/30/17 0910     LACTIC ACID 1 1 mmol/L     Narrative:         Result may be elevated if tourniquet was used during collection      CBC and differential [[id number]]  (Abnormal) Collected:  10/30/17 0830    Lab Status:  Final result Specimen:  Blood from Arm, Left Updated:  10/30/17 0851     WBC 3 50 (L) Thousand/uL      RBC 4 50 (L) Million/uL      Hemoglobin 13 5 (L) g/dL      Hematocrit 40 2 (L) %      MCV 89 fL      MCH 30 0 pg      MCHC 33 6 g/dL      RDW 13 4 % MPV 8 1 (L) fL      Platelets 868 Thousands/uL      nRBC 0 /100 WBCs      Neutrophils Relative 45 %      Lymphocytes Relative 44 %      Monocytes Relative 7 %      Eosinophils Relative 3 %      Basophils Relative 1 %      Neutrophils Absolute 1 60 (L) Thousands/µL      Lymphocytes Absolute 1 50 Thousands/µL      Monocytes Absolute 0 20 Thousand/µL      Eosinophils Absolute 0 10 Thousand/µL      Basophils Absolute 0 00 Thousands/µL                  No orders to display              Procedures  Procedures       Phone Contacts  ED Phone Contact    ED Course  ED Course                                MDM  Number of Diagnoses or Management Options  Abdominal pain: established and worsening  Colitis: new and does not require workup  Diagnosis management comments: Repeat labs rule out interval changes  - IV fluids  - p r n  Analgesia  - p r n   Antacids  - continue p o  antibiotics as previously prescribed  - Re eval, this       Amount and/or Complexity of Data Reviewed  Clinical lab tests: ordered and reviewed  Tests in the radiology section of CPT®: reviewed  Tests in the medicine section of CPT®: reviewed and ordered  Decide to obtain previous medical records or to obtain history from someone other than the patient: yes  Review and summarize past medical records: yes  Independent visualization of images, tracings, or specimens: yes    Risk of Complications, Morbidity, and/or Mortality  Presenting problems: moderate  Diagnostic procedures: low  Management options: low    Patient Progress  Patient progress: stable    CritCare Time    Disposition  Final diagnoses:   Abdominal pain   Colitis     Time reflects when diagnosis was documented in both MDM as applicable and the Disposition within this note     Time User Action Codes Description Comment    10/30/2017 10:38 AM Lorne Seek Add [R10 9] Abdominal pain     10/30/2017 10:38 AM Lorne Seek Add [K52 9] Colitis       ED Disposition     ED Disposition Condition Comment    Discharge  Geri Mejia discharge to home/self care  Condition at discharge: Good        Follow-up Information     Follow up With Specialties Details Why Contact Info    Delphine Fernandez MD Internal Medicine Schedule an appointment as soon as possible for a visit  [address] [facility]  [address]  [address] [place] [address]  [phone]          Discharge Medication List as of 10/30/2017 10:40 AM      START taking these medications    Details   naproxen (EC NAPROSYN) 500 MG EC tablet Take 1 tablet by mouth 2 (two) times a day with meals, Starting Mon 10/30/2017, Print      ondansetron (ZOFRAN) 4 mg tablet Take 1 tablet by mouth every 6 (six) hours, Starting Mon 10/30/2017, Print         CONTINUE these medications which have NOT CHANGED    Details   carisoprodol (SOMA) 350 mg tablet Take 350 mg by mouth every 6 (six) hours as needed for muscle spasms, Historical Med      ciprofloxacin (CIPRO) 500 mg tablet Take 1 tablet by mouth every 12 (twelve) hours for 10 days, Starting Sat 10/28/2017, Until Tue 11/7/2017, Print      metroNIDAZOLE (FLAGYL) 500 mg tablet Take 1 tablet by mouth every 8 (eight) hours for 10 days, Starting Sat 10/28/2017, Until Tue 11/7/2017, Print      Pantoprazole Sodium (PROTONIX PO) Take 40 mg by mouth daily, Historical Med      butalbital-acetaminophen-caffeine (FIORICET,ESGIC) -40 mg per tablet Take 1 tablet by mouth every 6 (six) hours as needed for headaches, Starting Sat 10/28/2017, Print           No discharge procedures on file      ED Provider  Electronically Signed by           Bin Black MD  11/02/17 9975

## 2018-01-12 VITALS — WEIGHT: 6.5 LBS | HEART RATE: 76 BPM | BODY MASS INDEX: 0.88 KG/M2 | RESPIRATION RATE: 16 BRPM | HEIGHT: 72 IN

## 2018-02-02 ENCOUNTER — APPOINTMENT (EMERGENCY)
Dept: RADIOLOGY | Facility: HOSPITAL | Age: 43
End: 2018-02-02
Payer: COMMERCIAL

## 2018-02-02 ENCOUNTER — HOSPITAL ENCOUNTER (EMERGENCY)
Facility: HOSPITAL | Age: 43
Discharge: HOME | End: 2018-02-02
Attending: EMERGENCY MEDICINE | Admitting: EMERGENCY MEDICINE
Payer: COMMERCIAL

## 2018-02-02 VITALS
RESPIRATION RATE: 18 BRPM | HEIGHT: 78 IN | HEART RATE: 73 BPM | TEMPERATURE: 98.4 F | SYSTOLIC BLOOD PRESSURE: 154 MMHG | BODY MASS INDEX: 36.45 KG/M2 | WEIGHT: 315 LBS | DIASTOLIC BLOOD PRESSURE: 86 MMHG | OXYGEN SATURATION: 99 %

## 2018-02-02 DIAGNOSIS — S13.9XXA NECK SPRAIN, INITIAL ENCOUNTER: Primary | ICD-10-CM

## 2018-02-02 PROCEDURE — 96372 THER/PROPH/DIAG INJ SC/IM: CPT

## 2018-02-02 PROCEDURE — 72125 CT NECK SPINE W/O DYE: CPT

## 2018-02-02 PROCEDURE — 99284 EMERGENCY DEPT VISIT MOD MDM: CPT

## 2018-02-02 PROCEDURE — 70450 CT HEAD/BRAIN W/O DYE: CPT

## 2018-02-02 RX ORDER — HYDROCODONE BITARTRATE AND ACETAMINOPHEN 5; 325 MG/1; MG/1
1 TABLET ORAL EVERY 8 HOURS PRN
Qty: 10 TABLET | Refills: 0 | Status: SHIPPED | OUTPATIENT
Start: 2018-02-02 | End: 2018-02-05

## 2018-02-02 RX ORDER — DIAZEPAM 5 MG/1
5 TABLET ORAL 2 TIMES DAILY
Qty: 6 TABLET | Refills: 0 | Status: SHIPPED | OUTPATIENT
Start: 2018-02-02 | End: 2019-03-09

## 2018-02-02 RX ADMIN — HYDROMORPHONE HYDROCHLORIDE 1 MG: 1 INJECTION, SOLUTION INTRAMUSCULAR; INTRAVENOUS; SUBCUTANEOUS at 13:07

## 2018-02-02 NOTE — DISCHARGE INSTRUCTIONS
Cervical Sprain   WHAT YOU NEED TO KNOW:   A cervical sprain is a stretched or torn muscle or ligament in your neck  Ligaments are strong tissues that connect bones  Common causes of cervical sprains include a car accident, a fall, or a sports injury  DISCHARGE INSTRUCTIONS:   Return to the emergency department if:   · You have pain or numbness from your shoulder down to your hand  · You have problems with your vision, hearing, or balance  · You feel confused or cannot concentrate  · You have problems with movement and strength  Contact your healthcare provider if:   · You have increased swelling or pain in your neck  · You have questions or concerns about your condition or care  Medicines: You may need any of the following:  · Acetaminophen  decreases pain and fever  It is available without a doctor's order  Ask how much to take and how often to take it  Follow directions  Read the labels of all other medicines you are using to see if they also contain acetaminophen, or ask your doctor or pharmacist  Acetaminophen can cause liver damage if not taken correctly  Do not use more than 4 grams (4,000 milligrams) total of acetaminophen in one day  · NSAIDs , such as ibuprofen, help decrease swelling, pain, and fever  This medicine is available with or without a doctor's order  NSAIDs can cause stomach bleeding or kidney problems in certain people  If you take blood thinner medicine, always ask your healthcare provider if NSAIDs are safe for you  Always read the medicine label and follow directions  · Muscle relaxers  help decrease pain and muscle spasms  · Prescription pain medicine  may be given  Ask your healthcare provider how to take this medicine safely  Some prescription pain medicines contain acetaminophen  Do not take other medicines that contain acetaminophen without talking to your healthcare provider  Too much acetaminophen may cause liver damage   Prescription pain medicine may cause constipation  Ask your healthcare provider how to prevent or treat constipation  · Take your medicine as directed  Contact your healthcare provider if you think your medicine is not helping or if you have side effects  Tell him or her if you are allergic to any medicine  Keep a list of the medicines, vitamins, and herbs you take  Include the amounts, and when and why you take them  Bring the list or the pill bottles to follow-up visits  Carry your medicine list with you in case of an emergency  Manage your symptoms:   · Apply heat  on your neck for 15 to 20 minutes, 4 to 6 times a day or as directed  Heat helps decrease pain, stiffness, and muscle spasms  · Begin gentle neck exercises  as soon as you can move your neck without pain  Exercises will help decrease stiffness and improve the strength and movement of your neck  Ask your healthcare provider what kind of exercises you should do  · Gradually return to your usual activities as directed  Stop if you have pain  Avoid activities that can cause more damage to your neck, such as heavy lifting or strenuous exercise  · Sleep without a pillow  to help decrease pain  Instead, roll a small towel tightly and place it under your neck  · Go to physical therapy as directed  A physical therapist teaches you exercises to help improve movement and strength, and to decrease pain  Prevent neck injury:   · Drive safely  Make sure everyone in your car wears a seatbelt  A seatbelt can save your life if you are in an accident  Do not use your cell phone when you are driving  This could distract you and cause an accident  Pull over if you need to make a call or send a text message  · Wear helmets, lifejackets, and protective gear  Always wear a helmet when you ride a bike or motorcycle, go skiing, or play sports that could cause a head injury  Wear protective equipment when you play sports   Wear a lifejacket when you are on a boat or doing water sports  Follow up with your healthcare provider as directed: You may be referred to an orthopedist or a physical therapist  Write down your questions so you remember to ask them during your visits  © 2017 [address] [employer] Information is for End User's use only and may not be sold, redistributed or otherwise used for commercial purposes  All illustrations and images included in CareNotes® are the copyrighted property of A [employer] M [employer]  or [employer]  The above information is an  only  It is not intended as medical advice for individual conditions or treatments  Talk to your doctor, nurse or pharmacist before following any medical regimen to see if it is safe and effective for you

## 2018-02-02 NOTE — ED PROVIDER NOTES
History  Chief Complaint   Patient presents with    Neck Pain     Tuesday patient walked through a doorway and struck the top of door frame, has had neck pain since     64-year-old male presents with neck pain and headache after a fall  Few days ago he accidentally ran into a door frame and since then has had the symptoms  When he initially make contact with a door frame he blacked out for few seconds fell backwards and his head  He denies any nausea vomiting altered mentation  No numbness tingling weakness or any other symptoms  Patient is not on anticoagulation at this time  History provided by:  Patient   used: No    Neck Pain       Prior to Admission Medications   Prescriptions Last Dose Informant Patient Reported? Taking? Pantoprazole Sodium (PROTONIX PO)   Yes No   Sig: Take 40 mg by mouth daily   butalbital-acetaminophen-caffeine (FIORICET,ESGIC) -40 mg per tablet   No No   Sig: Take 1 tablet by mouth every 6 (six) hours as needed for headaches   carisoprodol (SOMA) 350 mg tablet   Yes No   Sig: Take 350 mg by mouth every 6 (six) hours as needed for muscle spasms   naproxen (EC NAPROSYN) 500 MG EC tablet   No No   Sig: Take 1 tablet by mouth 2 (two) times a day with meals   ondansetron (ZOFRAN) 4 mg tablet   No No   Sig: Take 1 tablet by mouth every 6 (six) hours      Facility-Administered Medications: None       Past Medical History:   Diagnosis Date    Atrial fibrillation (HCC)     Chronic pain     back pain    GERD (gastroesophageal reflux disease)        Past Surgical History:   Procedure Laterality Date    ANKLE SURGERY Right     APPENDECTOMY      CHOLECYSTECTOMY      KNEE ARTHROPLASTY         History reviewed  No pertinent family history  I have reviewed and agree with the history as documented      Social History   Substance Use Topics    Smoking status: Never Smoker    Smokeless tobacco: Current User    Alcohol use No        Review of Systems Musculoskeletal: Positive for neck pain  All other systems reviewed and are negative  Physical Exam  ED Triage Vitals [02/02/18 1252]   Temperature Pulse Respirations Blood Pressure SpO2   98 4 °F (36 9 °C) 73 18 154/86 99 %      Temp Source Heart Rate Source Patient Position - Orthostatic VS BP Location FiO2 (%)   Tympanic Monitor Sitting Right arm --      Pain Score       9           Orthostatic Vital Signs  Vitals:    02/02/18 1252   BP: 154/86   Pulse: 73   Patient Position - Orthostatic VS: Sitting       Physical Exam   Constitutional: He is oriented to person, place, and time  He appears well-developed and well-nourished  HENT:   Head: Normocephalic and atraumatic  Left-sided cervical paravertebral tenderness noted with no midline tenderness or step-offs  Eyes: EOM are normal  Pupils are equal, round, and reactive to light  Neck: Normal range of motion  Neck supple  Cardiovascular: Normal rate and regular rhythm  Pulmonary/Chest: Effort normal and breath sounds normal    Abdominal: Soft  Bowel sounds are normal    Musculoskeletal: Normal range of motion  No triceps or biceps weakness noted  Sensation is intact  Motor strength is intact upper and lower extremity  Neurological: He is alert and oriented to person, place, and time  He displays normal reflexes  No cranial nerve deficit or sensory deficit  He exhibits normal muscle tone  Coordination normal    Skin: Skin is warm and dry  Psychiatric: He has a normal mood and affect  Nursing note and vitals reviewed  ED Medications  Medications   HYDROmorphone (DILAUDID) injection 1 mg (1 mg Intramuscular Given 2/2/18 1307)       Diagnostic Studies  Results Reviewed     None                 CT cervical spine without contrast   Final Result by Jesse Galindo MD (02/02 2289)      Mild spondylosis without acute fracture or subluxation                           Workstation performed: [id number]         CT head without contrast   Final Result by Lv Hagan MD (02/02 3165)      No acute intracranial abnormality  Workstation performed: [id number]                    Procedures  Procedures       Phone Contacts  ED Phone Contact    ED Course  ED Course                                MDM  Number of Diagnoses or Management Options  Neck sprain, initial encounter:   Diagnosis management comments: Patient evaluated with CT of the head and C-spine  I discussed the findings at length with the patient  I gave him a dose of IM Dilaudid which relieved his pain temporarily  Repeat neurologic exam was normal  I referred him to Spine surgery should he need it for further evaluation and gave him Valium and Percocet  I gave strict instructions to alternate the 2 medicines and not to mix it with alcohol and not to drive with them  Patient and wife verbalized understanding of instructions had no further questions at the time of discharge  Amount and/or Complexity of Data Reviewed  Tests in the radiology section of CPT®: ordered and reviewed  Tests in the medicine section of CPT®: ordered and reviewed    Patient Progress  Patient progress: stable    CritCare Time    Disposition  Final diagnoses:   Neck sprain, initial encounter     Time reflects when diagnosis was documented in both MDM as applicable and the Disposition within this note     Time User Action Codes Description Comment    2/2/2018  2:17 PM Anepu, Dorothea Add [I48 91] Atrial fibrillation ([address] )     2/2/2018  2:18 PM Anepu, Dorothea Remove [I48 91] Atrial fibrillation ([address] )     2/2/2018  2:18 PM Anepu, Dorothea Add [S13  9XXA] Neck sprain, initial encounter       ED Disposition     ED Disposition Condition Comment    Discharge  Luis Felipe Thomson discharge to home/self care      Condition at discharge: Stable        Follow-up Information     Follow up With Specialties Details Why Contact Info Additional Information    Bibi Lopez MD Internal Medicine Schedule an appointment as soon as possible for a visit  [address] [facility]  [address]  [address] [place]  [facility] [address]  [address] Emergency Department Emergency Medicine  If symptoms worsen [address]  [phone] [facility] ED, [place], [address] Mandie MD Orthopedic Surgery Schedule an appointment as soon as possible for a visit  [address]  [phone]           Discharge Medication List as of 2/2/2018  2:18 PM      START taking these medications    Details   diazepam (VALIUM) 5 mg tablet Take 1 tablet (5 mg total) by mouth 2 (two) times a day for 3 days, Starting Fri 2/2/2018, Until Mon 2/5/2018, Print      HYDROcodone-acetaminophen (NORCO) 5-325 mg per tablet Take 1 tablet by mouth every 8 (eight) hours as needed for pain for up to 3 days Max Daily Amount: 3 tablets, Starting Fri 2/2/2018, Until Mon 2/5/2018, Print         CONTINUE these medications which have NOT CHANGED    Details   butalbital-acetaminophen-caffeine (FIORICET,ESGIC) -40 mg per tablet Take 1 tablet by mouth every 6 (six) hours as needed for headaches, Starting Sat 10/28/2017, Print      carisoprodol (SOMA) 350 mg tablet Take 350 mg by mouth every 6 (six) hours as needed for muscle spasms, Historical Med      naproxen (EC NAPROSYN) 500 MG EC tablet Take 1 tablet by mouth 2 (two) times a day with meals, Starting Mon 10/30/2017, Print      ondansetron (ZOFRAN) 4 mg tablet Take 1 tablet by mouth every 6 (six) hours, Starting Mon 10/30/2017, Print      Pantoprazole Sodium (PROTONIX PO) Take 40 mg by mouth daily, Historical Med           No discharge procedures on file      ED Provider  Electronically Signed by           Zoraida Gil DO  02/02/18 9112

## 2018-07-09 DIAGNOSIS — I77.72 DISSECTION OF ILIAC ARTERY (HCC): ICD-10-CM

## 2018-07-27 ENCOUNTER — HOSPITAL ENCOUNTER (EMERGENCY)
Facility: HOSPITAL | Age: 43
Discharge: HOME | End: 2018-07-27
Attending: EMERGENCY MEDICINE
Payer: COMMERCIAL

## 2018-07-27 ENCOUNTER — APPOINTMENT (EMERGENCY)
Dept: RADIOLOGY | Facility: HOSPITAL | Age: 43
End: 2018-07-27
Payer: COMMERCIAL

## 2018-07-27 VITALS
DIASTOLIC BLOOD PRESSURE: 76 MMHG | RESPIRATION RATE: 16 BRPM | HEIGHT: 78 IN | WEIGHT: 315 LBS | BODY MASS INDEX: 36.45 KG/M2 | OXYGEN SATURATION: 97 % | SYSTOLIC BLOOD PRESSURE: 131 MMHG | TEMPERATURE: 98.8 F | HEART RATE: 71 BPM

## 2018-07-27 DIAGNOSIS — M10.9 GOUT: Primary | ICD-10-CM

## 2018-07-27 LAB
ANION GAP SERPL CALCULATED.3IONS-SCNC: 7 MMOL/L (ref 4–13)
APTT PPP: 26 SECONDS (ref 24–36)
BASOPHILS # BLD AUTO: 0.04 THOUSANDS/ΜL (ref 0–0.1)
BASOPHILS NFR BLD AUTO: 1 % (ref 0–1)
BUN SERPL-MCNC: 12 MG/DL (ref 5–25)
CALCIUM SERPL-MCNC: 9.1 MG/DL (ref 8.3–10.1)
CHLORIDE SERPL-SCNC: 104 MMOL/L (ref 100–108)
CO2 SERPL-SCNC: 28 MMOL/L (ref 21–32)
CREAT SERPL-MCNC: 1.08 MG/DL (ref 0.6–1.3)
EOSINOPHIL # BLD AUTO: 0.13 THOUSAND/ΜL (ref 0–0.61)
EOSINOPHIL NFR BLD AUTO: 2 % (ref 0–6)
ERYTHROCYTE [DISTWIDTH] IN BLOOD BY AUTOMATED COUNT: 13.1 % (ref 11.6–15.1)
GFR SERPL CREATININE-BSD FRML MDRD: 83 ML/MIN/1.73SQ M
GLUCOSE SERPL-MCNC: 86 MG/DL (ref 65–140)
HCT VFR BLD AUTO: 43.9 % (ref 36.5–49.3)
HGB BLD-MCNC: 14.4 G/DL (ref 12–17)
IMM GRANULOCYTES # BLD AUTO: 0.05 THOUSAND/UL (ref 0–0.2)
IMM GRANULOCYTES NFR BLD AUTO: 1 % (ref 0–2)
INR PPP: 0.92 (ref 0.86–1.16)
LYMPHOCYTES # BLD AUTO: 2.69 THOUSANDS/ΜL (ref 0.6–4.47)
LYMPHOCYTES NFR BLD AUTO: 34 % (ref 14–44)
MCH RBC QN AUTO: 29.6 PG (ref 26.8–34.3)
MCHC RBC AUTO-ENTMCNC: 32.8 G/DL (ref 31.4–37.4)
MCV RBC AUTO: 90 FL (ref 82–98)
MONOCYTES # BLD AUTO: 0.65 THOUSAND/ΜL (ref 0.17–1.22)
MONOCYTES NFR BLD AUTO: 8 % (ref 4–12)
NEUTROPHILS # BLD AUTO: 4.37 THOUSANDS/ΜL (ref 1.85–7.62)
NEUTS SEG NFR BLD AUTO: 54 % (ref 43–75)
NRBC BLD AUTO-RTO: 0 /100 WBCS
PLATELET # BLD AUTO: 210 THOUSANDS/UL (ref 149–390)
PMV BLD AUTO: 10.1 FL (ref 8.9–12.7)
POTASSIUM SERPL-SCNC: 3.9 MMOL/L (ref 3.5–5.3)
PROTHROMBIN TIME: 9.7 SECONDS (ref 9.4–11.7)
RBC # BLD AUTO: 4.87 MILLION/UL (ref 3.88–5.62)
SODIUM SERPL-SCNC: 139 MMOL/L (ref 136–145)
URATE SERPL-MCNC: 8.3 MG/DL (ref 4.2–8)
WBC # BLD AUTO: 7.93 THOUSAND/UL (ref 4.31–10.16)

## 2018-07-27 PROCEDURE — 80048 BASIC METABOLIC PNL TOTAL CA: CPT | Performed by: PHYSICIAN ASSISTANT

## 2018-07-27 PROCEDURE — 73630 X-RAY EXAM OF FOOT: CPT

## 2018-07-27 PROCEDURE — 36415 COLL VENOUS BLD VENIPUNCTURE: CPT | Performed by: PHYSICIAN ASSISTANT

## 2018-07-27 PROCEDURE — 96374 THER/PROPH/DIAG INJ IV PUSH: CPT

## 2018-07-27 PROCEDURE — 93926 LOWER EXTREMITY STUDY: CPT

## 2018-07-27 PROCEDURE — 84550 ASSAY OF BLOOD/URIC ACID: CPT | Performed by: EMERGENCY MEDICINE

## 2018-07-27 PROCEDURE — 85025 COMPLETE CBC W/AUTO DIFF WBC: CPT | Performed by: PHYSICIAN ASSISTANT

## 2018-07-27 PROCEDURE — 96376 TX/PRO/DX INJ SAME DRUG ADON: CPT

## 2018-07-27 PROCEDURE — 85730 THROMBOPLASTIN TIME PARTIAL: CPT | Performed by: PHYSICIAN ASSISTANT

## 2018-07-27 PROCEDURE — 73610 X-RAY EXAM OF ANKLE: CPT

## 2018-07-27 PROCEDURE — 99284 EMERGENCY DEPT VISIT MOD MDM: CPT

## 2018-07-27 PROCEDURE — 96375 TX/PRO/DX INJ NEW DRUG ADDON: CPT

## 2018-07-27 PROCEDURE — 93923 UPR/LXTR ART STDY 3+ LVLS: CPT

## 2018-07-27 PROCEDURE — 85610 PROTHROMBIN TIME: CPT | Performed by: PHYSICIAN ASSISTANT

## 2018-07-27 RX ORDER — TRAMADOL HYDROCHLORIDE 50 MG/1
50 TABLET ORAL ONCE
Status: COMPLETED | OUTPATIENT
Start: 2018-07-27 | End: 2018-07-27

## 2018-07-27 RX ORDER — OXYCODONE HYDROCHLORIDE AND ACETAMINOPHEN 5; 325 MG/1; MG/1
1 TABLET ORAL ONCE
Status: COMPLETED | OUTPATIENT
Start: 2018-07-27 | End: 2018-07-27

## 2018-07-27 RX ORDER — PREDNISONE 10 MG/1
TABLET ORAL
Qty: 20 TABLET | Refills: 0 | Status: SHIPPED | OUTPATIENT
Start: 2018-07-27 | End: 2019-03-09

## 2018-07-27 RX ORDER — OXYCODONE HYDROCHLORIDE AND ACETAMINOPHEN 5; 325 MG/1; MG/1
TABLET ORAL
Qty: 12 TABLET | Refills: 0 | Status: SHIPPED | OUTPATIENT
Start: 2018-07-27 | End: 2019-10-19 | Stop reason: HOSPADM

## 2018-07-27 RX ORDER — KETOROLAC TROMETHAMINE 30 MG/ML
30 INJECTION, SOLUTION INTRAMUSCULAR; INTRAVENOUS ONCE
Status: COMPLETED | OUTPATIENT
Start: 2018-07-27 | End: 2018-07-27

## 2018-07-27 RX ADMIN — PREDNISONE 50 MG: 20 TABLET ORAL at 22:52

## 2018-07-27 RX ADMIN — TRAMADOL HYDROCHLORIDE 50 MG: 50 TABLET, FILM COATED ORAL at 18:24

## 2018-07-27 RX ADMIN — HYDROMORPHONE HYDROCHLORIDE 1 MG: 1 INJECTION, SOLUTION INTRAMUSCULAR; INTRAVENOUS; SUBCUTANEOUS at 20:01

## 2018-07-27 RX ADMIN — HYDROMORPHONE HYDROCHLORIDE 1 MG: 1 INJECTION, SOLUTION INTRAMUSCULAR; INTRAVENOUS; SUBCUTANEOUS at 22:34

## 2018-07-27 RX ADMIN — KETOROLAC TROMETHAMINE 30 MG: 30 INJECTION, SOLUTION INTRAMUSCULAR at 19:16

## 2018-07-27 RX ADMIN — OXYCODONE HYDROCHLORIDE AND ACETAMINOPHEN 1 TABLET: 5; 325 TABLET ORAL at 22:53

## 2018-07-27 NOTE — ED NOTES
Pt reports burning pain since Tuesday worsening  Some redness noted b/l pt reports wears tight boots  + NVI  Denies fever or chills        Leticia Munson RN  07/27/18 4178

## 2018-07-27 NOTE — ED NOTES
Dr Estrella Handsome aware of pts complaints of pain, sts will come to bedside to oriana Panda RN  07/27/18 1941

## 2018-07-27 NOTE — ED NOTES
Pt reports pain to anterior aspect right foot radiating up right ankle, denies pain to toes no paresthesia, denies pain to calf        Akhil Guevara RN  07/27/18 5876

## 2018-07-27 NOTE — ED PROVIDER NOTES
History  Chief Complaint   Patient presents with    Foot Pain     since tuesday has had right foot pain and swelling     39 y/o male presenting with right foot and ankle pain x 4 days ranking 10/10 that radiates down the foot  No injury  Pain began after waking up  Has been working over the past few days and has not been resting  Pain started underneath the foot however has now spread to the entire ankle accompanied with some swelling  No skin discoloration  No calf pain or swelling  Has been taking OTC medications with minimal relief  This has never occurred before  Has had prior pain in the ankle however not like this  Very sensitive  Patient was on a mention that he has had reconstruction of the right ankle as well as a 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 Taking? Pantoprazole Sodium (PROTONIX PO) 7/26/2018 at Unknown time  Yes Yes   Sig: Take 40 mg by mouth daily   carisoprodol (SOMA) 350 mg tablet 7/26/2018 at Unknown time  Yes Yes   Sig: Take 350 mg by mouth every 6 (six) hours as needed for muscle spasms   diazepam (VALIUM) 5 mg tablet   No No   Sig: Take 1 tablet (5 mg total) by mouth 2 (two) times a day for 3 days      Facility-Administered Medications: None       Past Medical History:   Diagnosis Date    Atrial fibrillation (HCC)     Chronic pain     back pain    GERD (gastroesophageal reflux disease)        Past Surgical History:   Procedure Laterality Date    ANKLE SURGERY Right     APPENDECTOMY      CHOLECYSTECTOMY      KNEE ARTHROPLASTY         History reviewed  No pertinent family history  I have reviewed and agree with the history as documented      Social History   Substance Use Topics    Smoking status: Never Smoker    Smokeless tobacco: Current User    Alcohol use No        Review of Systems   Constitutional: Negative  Negative for activity change and appetite change  HENT: Negative  Eyes: Negative  Respiratory: Negative  Cardiovascular: Negative  Gastrointestinal: Negative  Genitourinary: Negative  Musculoskeletal: Positive for arthralgias and joint swelling  Negative for back pain, gait problem, myalgias, neck pain and neck stiffness  Skin: Negative  Neurological: Negative  All other systems reviewed and are negative  Physical Exam  Physical Exam   Constitutional: He is oriented to person, place, and time  He appears well-developed and well-nourished  Very irritable   HENT:   Head: Normocephalic and atraumatic  Cardiovascular: Normal rate  Pulmonary/Chest: Effort normal    spo2 is 97% indicating adequate oxygenation  Musculoskeletal: He exhibits tenderness  He exhibits no edema or deformity  Feet:    Neurological: He is alert and oriented to person, place, and time  Skin: Skin is warm and dry  Capillary refill takes less than 2 seconds  Nursing note and vitals reviewed        Vital Signs  ED Triage Vitals [07/27/18 1747]   Temperature Pulse Respirations Blood Pressure SpO2   98 3 °F (36 8 °C) 88 18 151/78 97 %      Temp src Heart Rate Source Patient Position - Orthostatic VS BP Location FiO2 (%)   -- -- -- -- --      Pain Score       Worst Possible Pain           Vitals:    07/27/18 1747   BP: 151/78   Pulse: 88       Visual Acuity      ED Medications  Medications   traMADol (ULTRAM) tablet 50 mg (50 mg Oral Given 7/27/18 1824)   ketorolac (TORADOL) injection 30 mg (30 mg Intravenous Given 7/27/18 1916)       Diagnostic Studies  Results Reviewed     Procedure Component Value Units Date/Time    CBC and differential [[id number]] Collected:  07/27/18 1916    Lab Status:  No result Specimen:  Blood from Arm, Right     Protime-INR [[id number]] Collected:  07/27/18 1916    Lab Status:  No result Specimen:  Blood from Arm, Right     APTT [[id number]] Collected:  07/27/18 1916    Lab Status:  No result Specimen:  Blood from Arm, Right     Basic metabolic panel [[id number]] Collected:  07/27/18 1916    Lab Status:  No result Specimen:  Blood from Arm, Right                  XR foot 3+ views RIGHT   Final Result by Chiara Corley MD (07/27 1845)      No acute osseous abnormality  Workstation performed: [id number]         XR ankle 3+ views RIGHT   Final Result by Chiara Corley MD (07/27 1844)      Mild lateral soft tissue swelling  No acute osseous abnormality  Workstation performed: [id number]         VAS lower limb arterial duplex, limited, unilateral    (Results Pending)              Procedures  Procedures       Phone Contacts  ED Phone Contact    ED Course  ED Course as of Jul 27 1918 Fri Jul 27, 2018   NATI Crockett 68 Called for reading     1903 Vascular made aware and coming                                 MDM  Number of Diagnoses or Management Options  Diagnosis management comments: Patient 7 foot, unsure if prior connective tissue disorder  Patient has had surgery to the right ankle and has a known right internal iliac dissection  Concern for potential the right lower extremity clot due severe pain and poor pulses however good cap refill  Vascular coming to perform study   Signed out to Dr Hannah Mims at the end of my shift         Amount and/or Complexity of Data Reviewed  Clinical lab tests: ordered  Tests in the radiology section of CPT®: ordered  Review and summarize past medical records: yes  Discuss the patient with other providers: yes  Independent visualization of images, tracings, or specimens: yes      CritCare Time    Disposition  Final diagnoses:   None     ED Disposition     None      Follow-up Information    None         Patient's Medications   Discharge Prescriptions    No medications on file     No discharge procedures on file      ED Provider  Electronically Signed by           Baylee Barrera PA-C  07/27/18 1918

## 2018-07-28 PROCEDURE — 93922 UPR/L XTREMITY ART 2 LEVELS: CPT | Performed by: SURGERY

## 2018-07-28 PROCEDURE — 93926 LOWER EXTREMITY STUDY: CPT | Performed by: SURGERY

## 2018-07-28 NOTE — ED NOTES
Vascular tech arrived setting up room and will call when ready     Lida Panda RN  07/27/18 Douglas Frederick RN  07/27/18 2029

## 2018-07-28 NOTE — ED NOTES
No change in peripheral vascular assessment, resp easy and unlabored, awaiting U/S      Macario Gonzalez RN  07/27/18 2009

## 2018-07-28 NOTE — ED CARE HANDOFF
Emergency Department Sign Out Note        Sign out and transfer of care from **PA*  See Separate Emergency Department note  The patient, Petra Xiao, was evaluated by the previous provider for *right ankle pain**  Workup Completed:  **labs*    ED Course / Workup Pending (followup):  **vascular study*                             Procedures  [facility]  Number of Diagnoses or Management Options  Diagnosis management comments: Pt  Woke up with right ankle/foot pain 4 days ago  Pain worsening and severe  No trauma  History reviewed and a year ago, pt  Was diagnosed with iliac artery dissection found incidentally on a CT scan done for flank pain at [facility]  Pt  Seen by vascular, dissection felt to be due to vascular injury during appy many years ago  Per vascular note, pt  Was supposed to have follow up CT scan and then duplex study in a year  Pt  Never followed up and says that office never called him or told him to get studies done  On exam, pt  Has severe ttp and purplish redness of lateral mall area  No fever or elevated wbc  ? Gout  Will tx empirically and advised pain med prn, rest, elevate, follow up outpt  CritCare Time      Disposition  Final diagnoses:   Gout     Time reflects when diagnosis was documented in both MDM as applicable and the Disposition within this note     Time User Action Codes Description Comment    0/67/3881 04:77 PM Sonal Mackay Add [A38 6] Gout       ED Disposition     ED Disposition Condition Comment    Discharge  Petra Xiao discharge to home/self care      Condition at discharge: Stable        Follow-up Information     Follow up With Specialties Details Why Contact Info    Ethan Hitchcock MD Orthopedic Surgery Schedule an appointment as soon as possible for a visit in 2 days  [address]  [phone]          Discharge Medication List as of 7/27/2018 10:47 PM      START taking these medications    Details oxyCODONE-acetaminophen (PERCOCET) 5-325 mg per tablet 1-2 po every 6 hours prn severe pain, Print      predniSONE 10 mg tablet 4 po daily x2 days, then 3 po daily x2 days, then 2 po daily x2 days,then 1 po daily x2days, Print         CONTINUE these medications which have NOT CHANGED    Details   carisoprodol (SOMA) 350 mg tablet Take 350 mg by mouth every 6 (six) hours as needed for muscle spasms, Historical Med      Pantoprazole Sodium (PROTONIX PO) Take 40 mg by mouth daily, Historical Med      diazepam (VALIUM) 5 mg tablet Take 1 tablet (5 mg total) by mouth 2 (two) times a day for 3 days, Starting Fri 2/2/2018, Until Mon 2/5/2018, Print           No discharge procedures on file         ED Provider  Electronically Signed by     Hermes Hewitt MD  07/98/50 9130

## 2018-07-28 NOTE — ED NOTES
Pt appears much more comfortable, reports pain decreased to 6/10 no resp distress observed      Barbie Orr RN  07/27/18 2023

## 2018-07-28 NOTE — DISCHARGE INSTRUCTIONS
Gout   WHAT YOU NEED TO KNOW:   Gout is a form of arthritis that causes severe joint pain, redness, swelling, and stiffness  Acute gout pain starts suddenly, gets worse quickly, and stops on its own  Acute gout can become chronic and cause permanent damage to the joints  DISCHARGE INSTRUCTIONS:   Return to the emergency department if:   · You have severe pain in one or more of your joints that you cannot tolerate  · You have a fever or redness that spreads beyond the joint area  Contact your healthcare provider if:   · You have new symptoms, such as a rash, after you start gout treatment  · Your joint pain and swelling do not go away, even after treatment  · You are not urinating as much or as often as you usually do  · You have trouble taking your gout medicines  · You have questions or concerns about your condition or care  Medicines: You may need any of the following:  · Prescription pain medicine  may be given  Ask your healthcare provider how to take this medicine safely  Some prescription pain medicines contain acetaminophen  Do not take other medicines that contain acetaminophen without talking to your healthcare provider  Too much acetaminophen may cause liver damage  Prescription pain medicine may cause constipation  Ask your healthcare provider how to prevent or treat constipation  · NSAIDs , such as ibuprofen, help decrease swelling, pain, and fever  This medicine is available with or without a doctor's order  NSAIDs can cause stomach bleeding or kidney problems in certain people  If you take blood thinner medicine, always ask your healthcare provider if NSAIDs are safe for you  Always read the medicine label and follow directions  · Gout medicine  decreases joint pain and swelling  It may also be given to prevent new gout attacks  · Steroids  reduce inflammation and can help your joint stiffness and pain during gout attacks      · Uric acid medicine  may be given to reduce the amount of uric acid your body makes  Some medicines may help you pass more uric acid when you urinate  · Take your medicine as directed  Contact your healthcare provider if you think your medicine is not helping or if you have side effects  Tell him or her if you are allergic to any medicine  Keep a list of the medicines, vitamins, and herbs you take  Include the amounts, and when and why you take them  Bring the list or the pill bottles to follow-up visits  Carry your medicine list with you in case of an emergency  Follow up with your healthcare provider as directed:  Write down your questions so you remember to ask them during your visits  Manage gout:   · Rest your painful joint so it can heal   Your healthcare provider may recommend crutches or a walker if the affected joint is in a leg  · Apply ice to your joint  Ice decreases pain and swelling  Use an ice pack, or put crushed ice in a plastic bag  Cover the ice pack or bag with a towel before you apply it to your painful joint  Apply ice for 15 to 20 minutes every hour, or as directed  · Elevate your joint  Elevation helps reduce swelling and pain  Raise your joint above the level of your heart as often as you can  Prop your painful joint on pillows to keep it above your heart comfortably  · Go to physical therapy if directed  A physical therapist can teach you exercises to improve flexibility and range of motion  Prevent gout attacks:   · Do not eat high-purine foods  These foods include meats, seafood, asparagus, spinach, cauliflower, and some types of beans  Healthcare providers may tell you to eat more low-fat milk products, such as yogurt  Milk products may decrease your risk for gout attacks  Vitamin C and coffee may also help  Your healthcare provider or dietitian can help you create a meal plan  · Drink more liquids as directed  Liquids such as water help remove uric acid from your body   Ask how much liquid to drink each day and which liquids are best for you  · Manage your weight  Weight loss may decrease the amount of uric acid in your body  Exercise can help you lose weight  Talk to your healthcare provider about the best exercises for you  · Control your blood sugar level if you have diabetes  Keep your blood sugar level in a normal range  This can help prevent gout attacks  · Limit or do not drink alcohol as directed  Alcohol can trigger a gout attack  Alcohol also increases your risk for dehydration  Ask your healthcare provider if alcohol is safe for you  © 2017 [facility] [facility] Information is for End User's use only and may not be sold, redistributed or otherwise used for commercial purposes  All illustrations and images included in CareNotes® are the copyrighted property of [employer]  or [employer]  The above information is an  only  It is not intended as medical advice for individual conditions or treatments  Talk to your doctor, nurse or pharmacist before following any medical regimen to see if it is safe and effective for you

## 2018-07-28 NOTE — ED NOTES
Pt reports increased pain since study completed md made aware additional orders received      Phoebe Palomino, TANIA  07/27/18 5417

## 2018-07-28 NOTE — ED NOTES
Vascular study completed   Pt resting comfortably, reports pain 5/10     Macario Gonzalez, RN  07/27/18 6260

## 2018-09-13 DIAGNOSIS — I73.9 PERIPHERAL VASCULAR DISEASE, UNSPECIFIED (HCC): Primary | ICD-10-CM

## 2018-09-19 ENCOUNTER — OFFICE VISIT (OUTPATIENT)
Dept: URGENT CARE | Facility: MEDICAL CENTER | Age: 43
End: 2018-09-19
Payer: COMMERCIAL

## 2018-09-19 VITALS
HEART RATE: 66 BPM | OXYGEN SATURATION: 95 % | WEIGHT: 315 LBS | TEMPERATURE: 98 F | BODY MASS INDEX: 36.45 KG/M2 | HEIGHT: 78 IN | RESPIRATION RATE: 20 BRPM | SYSTOLIC BLOOD PRESSURE: 130 MMHG | DIASTOLIC BLOOD PRESSURE: 70 MMHG

## 2018-09-19 DIAGNOSIS — S80.01XA CONTUSION OF RIGHT KNEE, INITIAL ENCOUNTER: Primary | ICD-10-CM

## 2018-09-19 DIAGNOSIS — S33.5XXA LUMBAR SPRAIN, INITIAL ENCOUNTER: ICD-10-CM

## 2018-09-19 DIAGNOSIS — S50.02XA CONTUSION OF LEFT ELBOW, INITIAL ENCOUNTER: ICD-10-CM

## 2018-09-19 PROCEDURE — 99213 OFFICE O/P EST LOW 20 MIN: CPT | Performed by: FAMILY MEDICINE

## 2018-09-19 RX ORDER — ASPIRIN 81 MG/1
1 TABLET, CHEWABLE ORAL DAILY
COMMUNITY
Start: 2017-02-06 | End: 2019-03-09

## 2018-09-19 NOTE — PROGRESS NOTES
[facility]        NAME: Deng Mckeon is a 38 y o  male  : 1975    MRN: [id number]  DATE: 2018  TIME: 12:20 PM    Assessment and Plan   Contusion of right knee, initial encounter [S80 01XA]  1  Contusion of right knee, initial encounter     2  Contusion of left elbow, initial encounter     3  Lumbar sprain, initial encounter           Patient Instructions     Elbow, knee contusion  Lumbar sprain  Rest, ice, elevate  Robaxin as directed  Naprosyn twice daily x 10 days  Follow up with PCP in 3-5 days  Proceed to  ER if symptoms worsen  Chief Complaint     Chief Complaint   Patient presents with    Back Pain     fell today at home when he tripped on bike and lyed on sidewalk on grass  fell forward on knees   c/o back pain          History of Present Illness       39 y/o male states he tripped and fell in porch this morning hurting his right knee and left elbow  Patient also c/o soreness to lower back  Denies head trauma, LOC, dizziness        Review of Systems   Review of Systems   Constitutional: Negative  Respiratory: Negative  Cardiovascular: Negative  Musculoskeletal: Positive for arthralgias and myalgias  Neurological: Negative            Current Medications       Current Outpatient Prescriptions:     aspirin 81 mg chewable tablet, Chew 1 tablet daily, Disp: , Rfl:     carisoprodol (SOMA) 350 mg tablet, Take 350 mg by mouth every 6 (six) hours as needed for muscle spasms, Disp: , Rfl:     Pantoprazole Sodium (PROTONIX PO), Take 40 mg by mouth daily, Disp: , Rfl:     diazepam (VALIUM) 5 mg tablet, Take 1 tablet (5 mg total) by mouth 2 (two) times a day for 3 days, Disp: 6 tablet, Rfl: 0    oxyCODONE-acetaminophen (PERCOCET) 5-325 mg per tablet, 1-2 po every 6 hours prn severe pain (Patient not taking: Reported on 2018 ), Disp: 12 tablet, Rfl: 0    predniSONE 10 mg tablet, 4 po daily x2 days, then 3 po daily x2 days, then 2 po daily x2 days,then 1 po daily x2days (Patient not taking: Reported on 9/19/2018 ), Disp: 20 tablet, Rfl: 0    Current Allergies     Allergies as of 09/19/2018 - Reviewed 09/19/2018   Allergen Reaction Noted    Penicillins Anaphylaxis 09/20/2017    Morphine Itching 02/06/2017            The following portions of the patient's history were reviewed and updated as appropriate: allergies, current medications, past family history, past medical history, past social history, past surgical history and problem list      Past Medical History:   Diagnosis Date    Atrial fibrillation ([address] )     Chronic pain     back pain    GERD (gastroesophageal reflux disease)        Past Surgical History:   Procedure Laterality Date    ANKLE SURGERY Right     APPENDECTOMY      CHOLECYSTECTOMY      KNEE ARTHROPLASTY         No family history on file  Medications have been verified  Objective   /70   Pulse 66   Temp 98 °F (36 7 °C) (Temporal)   Resp 20   Ht 6' 9" (2 057 m)   Wt (!) 152 kg (334 lb 8 oz)   SpO2 95%   BMI 35 85 kg/m²        Physical Exam     Physical Exam   Constitutional: He appears well-developed and well-nourished  No distress  Neck: Normal range of motion  Neck supple  Cardiovascular: Normal rate, regular rhythm, normal heart sounds and intact distal pulses  Pulmonary/Chest: Effort normal and breath sounds normal    Musculoskeletal:        Left elbow: He exhibits decreased range of motion and swelling  He exhibits no effusion, no deformity and no laceration  Tenderness found  Olecranon process tenderness noted  No radial head, no medial epicondyle and no lateral epicondyle tenderness noted  Lumbar back: He exhibits decreased range of motion, tenderness, pain and spasm  He exhibits no bony tenderness, no swelling, no edema, no deformity, no laceration and normal pulse  Back:         Legs:  Lymphadenopathy:     He has no cervical adenopathy  Skin: He is not diaphoretic

## 2018-09-19 NOTE — PATIENT INSTRUCTIONS
Elbow, knee contusion  Lumbar sprain  Rest, ice, elevate  Robaxin as directed  Naprosyn twice daily x 10 days  Follow up with PCP in 3-5 days  Proceed to  ER if symptoms worsen  Contusion in Adults   WHAT YOU NEED TO KNOW:   What is a contusion? A contusion is a bruise that appears on your skin after an injury  A bruise happens when small blood vessels tear but skin does not  When blood vessels tear, blood leaks into nearby tissue, such as soft tissue or muscle  What causes a contusion? A hard object or a strained muscle can leave a bruise on your skin  A twisted knee or ankle can cause a bone bruise  You may get a bruise near an area where you have had blood taken for medical tests  What increases my risk for a contusion? · A bleeding disorder that makes you bleed more easily    · Kidney or liver disease, or an infection    · A family history of bleeding problems    · Medicines such as blood thinners or certain over-the-counter medicines and herbal medicines    · Weakened skin and muscles from older age or poor nutrition  What other signs and symptoms may I have with a contusion? · Pain that increases when you touch the bruise, walk, or use the area around the bruise    · Swelling or a lump at the site of the bruise or near it    · Red, blue, or black skin that may change to green or yellow after a few days           · Stiffness or problems moving the bruised area of your body  How is a contusion diagnosed? Your healthcare provider may ask about any injuries, infections, or bleeding problems you had in the past  He or she will check the skin over the injured area  He or she may touch it to see where it hurts  He or she may also check for problems you may have when you move your bruised area  You may need any of the following tests:  · Blood tests  may be used to check for blood disorders or to see how long it takes for your blood to clot       · Ultrasound  pictures may show how deep the bruise is and if any of your organs, such as your liver, are injured  · MRI  pictures may show if a hematoma (pooling of blood) has started to form  You may be given contrast liquid to help the pictures show up better  Tell the healthcare provider if you have ever had an allergic reaction to contrast liquid  Do not enter the MRI room with anything metal  Metal can cause serious injury  Tell the healthcare provider if you have any metal in or on your body  How is a contusion treated? Your bruise may heal without any treatment  Treatment depends on the part of your body that is injured, and how serious your injury is  You may need any of the following:  · NSAIDs , such as ibuprofen, help decrease swelling, pain, and fever  This medicine is available with or without a doctor's order  NSAIDs can cause stomach bleeding or kidney problems in certain people  If you take blood thinner medicine, always ask your healthcare provider if NSAIDs are safe for you  Always read the medicine label and follow directions  · Prescription pain medicine  may be given  Do not wait until the pain is severe before you take your medicine  · Aspiration  is a procedure to drain pooled blood in your muscle  This prevents increased pressure in the muscle  · Surgery  may be done to repair a tear in the muscle or relieve pressure in the muscle caused by swelling  What can I do to help my contusion heal?   · Rest the injured area  or use it less than usual  If you bruised your leg or foot, you may need crutches or a cane to help you walk  This will help you keep weight off your injured body part  · Apply ice  to decrease swelling and pain  Ice may also help prevent tissue damage  Use an ice pack, or put crushed ice in a plastic bag  Cover it with a towel and place it on your bruise for 15 to 20 minutes every hour or as directed  · Use compression  to support the area and decrease swelling   Wrap an elastic bandage around the area over the bruised muscle  Make sure the bandage is not too tight  You should be able to fit 1 finger between the bandage and your skin  · Elevate (raise) your injured body part  above the level of your heart to help decrease pain and swelling  Use pillows, blankets, or rolled towels to elevate the area as often as you can  · Do not drink alcohol  as directed  Alcohol may slow healing  · Do not stretch injured muscles  right after your injury  Ask your healthcare provider when and how you may safely stretch after your injury  Gentle stretches can help increase your flexibility  · Do not massage the area or put heating pads  on the bruise right after your injury  Heat and massage may slow healing  Your healthcare provider may tell you to apply heat after several days  At that time, heat will start to help the injury heal   How can I prevent a contusion? · Stretch and warm up before you play sports or exercise  · Wear protective gear when you play sports  Examples are shin guards and padding  · If you begin a new physical activity, start slowly to give your body a chance to adjust   When should I seek immediate care? · You have new trouble moving the injured area  · You have tingling or numbness in or near the injured area  · Your hand or foot below the bruise gets cold or turns pale  When should I contact my healthcare provider? · You find a new lump in the injured area  · Your symptoms do not improve with treatment after 4 to 5 days  · You have questions or concerns about your condition or care  CARE AGREEMENT:   You have the right to help plan your care  Learn about your health condition and how it may be treated  Discuss treatment options with your caregivers to decide what care you want to receive  You always have the right to refuse treatment  The above information is an  only  It is not intended as medical advice for individual conditions or treatments   Talk to your doctor, nurse or pharmacist before following any medical regimen to see if it is safe and effective for you  © 2017 [employer] Information is for End User's use only and may not be sold, redistributed or otherwise used for commercial purposes  All illustrations and images included in CareNotes® are the copyrighted property of [employer]  or [employer]

## 2018-10-09 ENCOUNTER — HOSPITAL ENCOUNTER (OUTPATIENT)
Dept: NON INVASIVE DIAGNOSTICS | Facility: CLINIC | Age: 43
Discharge: HOME | End: 2018-10-09
Payer: COMMERCIAL

## 2018-10-09 DIAGNOSIS — I77.72 DISSECTION OF ILIAC ARTERY (HCC): ICD-10-CM

## 2018-10-09 PROCEDURE — 93978 VASCULAR STUDY: CPT

## 2018-10-11 PROCEDURE — 93978 VASCULAR STUDY: CPT | Performed by: SURGERY

## 2018-11-17 ENCOUNTER — APPOINTMENT (EMERGENCY)
Dept: RADIOLOGY | Facility: HOSPITAL | Age: 43
End: 2018-11-17
Payer: COMMERCIAL

## 2018-11-17 ENCOUNTER — HOSPITAL ENCOUNTER (EMERGENCY)
Facility: HOSPITAL | Age: 43
Discharge: HOME | End: 2018-11-17
Attending: EMERGENCY MEDICINE | Admitting: EMERGENCY MEDICINE
Payer: COMMERCIAL

## 2018-11-17 VITALS
DIASTOLIC BLOOD PRESSURE: 66 MMHG | SYSTOLIC BLOOD PRESSURE: 133 MMHG | HEIGHT: 78 IN | WEIGHT: 315 LBS | HEART RATE: 70 BPM | TEMPERATURE: 97.5 F | BODY MASS INDEX: 36.45 KG/M2 | OXYGEN SATURATION: 95 % | RESPIRATION RATE: 14 BRPM

## 2018-11-17 DIAGNOSIS — M70.20 OLECRANON BURSITIS: Primary | ICD-10-CM

## 2018-11-17 PROCEDURE — 73080 X-RAY EXAM OF ELBOW: CPT

## 2018-11-17 PROCEDURE — 96372 THER/PROPH/DIAG INJ SC/IM: CPT

## 2018-11-17 PROCEDURE — 99283 EMERGENCY DEPT VISIT LOW MDM: CPT

## 2018-11-17 RX ORDER — KETOROLAC TROMETHAMINE 30 MG/ML
15 INJECTION, SOLUTION INTRAMUSCULAR; INTRAVENOUS ONCE
Status: COMPLETED | OUTPATIENT
Start: 2018-11-17 | End: 2018-11-17

## 2018-11-17 RX ORDER — OXYCODONE HYDROCHLORIDE AND ACETAMINOPHEN 5; 325 MG/1; MG/1
1 TABLET ORAL EVERY 8 HOURS PRN
Qty: 6 TABLET | Refills: 0 | Status: SHIPPED | OUTPATIENT
Start: 2018-11-17 | End: 2018-11-19

## 2018-11-17 RX ADMIN — KETOROLAC TROMETHAMINE 15 MG: 30 INJECTION, SOLUTION INTRAMUSCULAR at 11:31

## 2018-11-17 NOTE — DISCHARGE INSTRUCTIONS
Elbow Bursitis   WHAT YOU NEED TO KNOW:   Elbow bursitis is inflammation of the bursa in your elbow  The bursa is a fluid-filled sac that acts as a cushion between a bone and a tendon  A tendon is a cord of strong tissue that connects muscles to bones  The bursa is located right under the point of your elbow  DISCHARGE INSTRUCTIONS:   Medicines:   · NSAIDs:  These medicines decrease swelling, pain, and fever  NSAIDs are available without a doctor's order  Ask your healthcare provider which medicine is right for you  Ask how much to take and when to take it  Take as directed  NSAIDs can cause stomach bleeding and kidney problems if not taken correctly  · Antibiotics: These help fight an infection caused by bacteria  You may need antibiotics if your bursitis is caused by infection  · Take your medicine as directed  Contact your healthcare provider if you think your medicine is not helping or if you have side effects  Tell him of her if you are allergic to any medicine  Keep a list of the medicines, vitamins, and herbs you take  Include the amounts, and when and why you take them  Bring the list or the pill bottles to follow-up visits  Carry your medicine list with you in case of an emergency  Manage your symptoms:   · Rest:  Rest your elbow as much as possible to decrease pain and swelling  Slowly start to do more each day  Return to your daily activities as directed  · Ice:  Ice helps decrease swelling and pain  Ice may also help prevent tissue damage  Use an ice pack, or put crushed ice in a plastic bag  Cover it with a towel and place it on your elbow for 15 to 20 minutes, 3 to 4 times each day, as directed  · Compress:  Healthcare providers may wrap your arm with tape or an elastic bandage to decrease swelling  Loosen the elastic bandage if you start to lose feeling in your fingers  · Elevate:  Raise your elbow above the level of your heart as often as you can   This will help decrease swelling and pain  Prop your elbow on pillows or blankets to keep it elevated comfortably  Physical therapy:  A physical therapist teaches you exercises to help improve movement and strength, and to decrease pain  Prevent another elbow injury:   · Avoid injury and pressure to your elbows: Wear elbow pads or protectors when you play sports  Do not lean on your elbows or clench your fists  Do not tightly  small items, such as tools or pens  · Stretch, warm up, and cool down:  Always stretch and do warmup and cool-down exercises before and after you exercise  This will help loosen your muscles and decrease stress on your elbow  Rest between workouts  Follow up with your healthcare provider as directed:  Write down your questions so you remember to ask them during your visits  Contact your healthcare provider if:   · Your pain and swelling increase  · Your symptoms do not improve after 10 days of treatment  · You have a fever  · You have questions or concerns about your condition or care  © 2017 [facility] Information is for End User's use only and may not be sold, redistributed or otherwise used for commercial purposes  All illustrations and images included in CareNotes® are the copyrighted property of [employer]  or [employer]  The above information is an  only  It is not intended as medical advice for individual conditions or treatments  Talk to your doctor, nurse or pharmacist before following any medical regimen to see if it is safe and effective for you

## 2019-01-31 ENCOUNTER — APPOINTMENT (OUTPATIENT)
Dept: LAB | Facility: CLINIC | Age: 44
End: 2019-01-31
Payer: COMMERCIAL

## 2019-01-31 DIAGNOSIS — Z01.812 PRE-OPERATIVE LABORATORY EXAMINATION: Primary | ICD-10-CM

## 2019-01-31 LAB
ALBUMIN SERPL BCP-MCNC: 3.7 G/DL (ref 3.5–5)
ALP SERPL-CCNC: 65 U/L (ref 46–116)
ALT SERPL W P-5'-P-CCNC: 37 U/L (ref 12–78)
ANION GAP SERPL CALCULATED.3IONS-SCNC: 7 MMOL/L (ref 4–13)
AST SERPL W P-5'-P-CCNC: 22 U/L (ref 5–45)
BASOPHILS # BLD AUTO: 0.05 THOUSANDS/ΜL (ref 0–0.1)
BASOPHILS NFR BLD AUTO: 1 % (ref 0–1)
BILIRUB SERPL-MCNC: 0.4 MG/DL (ref 0.2–1)
BUN SERPL-MCNC: 13 MG/DL (ref 5–25)
CALCIUM SERPL-MCNC: 8.5 MG/DL (ref 8.3–10.1)
CHLORIDE SERPL-SCNC: 107 MMOL/L (ref 100–108)
CO2 SERPL-SCNC: 27 MMOL/L (ref 21–32)
CREAT SERPL-MCNC: 1.31 MG/DL (ref 0.6–1.3)
EOSINOPHIL # BLD AUTO: 0.09 THOUSAND/ΜL (ref 0–0.61)
EOSINOPHIL NFR BLD AUTO: 2 % (ref 0–6)
ERYTHROCYTE [DISTWIDTH] IN BLOOD BY AUTOMATED COUNT: 12.8 % (ref 11.6–15.1)
GFR SERPL CREATININE-BSD FRML MDRD: 65 ML/MIN/1.73SQ M
GLUCOSE P FAST SERPL-MCNC: 138 MG/DL (ref 65–99)
HCT VFR BLD AUTO: 44 % (ref 36.5–49.3)
HGB BLD-MCNC: 14.8 G/DL (ref 12–17)
IMM GRANULOCYTES # BLD AUTO: 0.02 THOUSAND/UL (ref 0–0.2)
IMM GRANULOCYTES NFR BLD AUTO: 0 % (ref 0–2)
LYMPHOCYTES # BLD AUTO: 2.18 THOUSANDS/ΜL (ref 0.6–4.47)
LYMPHOCYTES NFR BLD AUTO: 44 % (ref 14–44)
MCH RBC QN AUTO: 30 PG (ref 26.8–34.3)
MCHC RBC AUTO-ENTMCNC: 33.6 G/DL (ref 31.4–37.4)
MCV RBC AUTO: 89 FL (ref 82–98)
MONOCYTES # BLD AUTO: 0.45 THOUSAND/ΜL (ref 0.17–1.22)
MONOCYTES NFR BLD AUTO: 9 % (ref 4–12)
NEUTROPHILS # BLD AUTO: 2.15 THOUSANDS/ΜL (ref 1.85–7.62)
NEUTS SEG NFR BLD AUTO: 44 % (ref 43–75)
NRBC BLD AUTO-RTO: 0 /100 WBCS
PLATELET # BLD AUTO: 212 THOUSANDS/UL (ref 149–390)
PMV BLD AUTO: 10.6 FL (ref 8.9–12.7)
POTASSIUM SERPL-SCNC: 3.7 MMOL/L (ref 3.5–5.3)
PROT SERPL-MCNC: 6.6 G/DL (ref 6.4–8.2)
RBC # BLD AUTO: 4.94 MILLION/UL (ref 3.88–5.62)
SODIUM SERPL-SCNC: 141 MMOL/L (ref 136–145)
WBC # BLD AUTO: 4.94 THOUSAND/UL (ref 4.31–10.16)

## 2019-01-31 PROCEDURE — 36415 COLL VENOUS BLD VENIPUNCTURE: CPT

## 2019-01-31 PROCEDURE — 85025 COMPLETE CBC W/AUTO DIFF WBC: CPT

## 2019-01-31 PROCEDURE — 80053 COMPREHEN METABOLIC PANEL: CPT

## 2019-03-09 ENCOUNTER — APPOINTMENT (EMERGENCY)
Dept: RADIOLOGY | Facility: HOSPITAL | Age: 44
End: 2019-03-09
Payer: COMMERCIAL

## 2019-03-09 ENCOUNTER — HOSPITAL ENCOUNTER (EMERGENCY)
Facility: HOSPITAL | Age: 44
Discharge: HOME | End: 2019-03-09
Attending: EMERGENCY MEDICINE | Admitting: EMERGENCY MEDICINE
Payer: COMMERCIAL

## 2019-03-09 VITALS
OXYGEN SATURATION: 96 % | HEART RATE: 84 BPM | RESPIRATION RATE: 18 BRPM | SYSTOLIC BLOOD PRESSURE: 124 MMHG | BODY MASS INDEX: 35.9 KG/M2 | TEMPERATURE: 98.4 F | WEIGHT: 315 LBS | DIASTOLIC BLOOD PRESSURE: 78 MMHG

## 2019-03-09 DIAGNOSIS — M54.42 ACUTE LEFT-SIDED LOW BACK PAIN WITH LEFT-SIDED SCIATICA: Primary | ICD-10-CM

## 2019-03-09 PROCEDURE — 72100 X-RAY EXAM L-S SPINE 2/3 VWS: CPT

## 2019-03-09 PROCEDURE — 96372 THER/PROPH/DIAG INJ SC/IM: CPT

## 2019-03-09 PROCEDURE — 99283 EMERGENCY DEPT VISIT LOW MDM: CPT

## 2019-03-09 PROCEDURE — 72170 X-RAY EXAM OF PELVIS: CPT

## 2019-03-09 RX ORDER — OXYCODONE HYDROCHLORIDE AND ACETAMINOPHEN 5; 325 MG/1; MG/1
1 TABLET ORAL ONCE
Status: COMPLETED | OUTPATIENT
Start: 2019-03-09 | End: 2019-03-09

## 2019-03-09 RX ORDER — KETOROLAC TROMETHAMINE 30 MG/ML
30 INJECTION, SOLUTION INTRAMUSCULAR; INTRAVENOUS ONCE
Status: COMPLETED | OUTPATIENT
Start: 2019-03-09 | End: 2019-03-09

## 2019-03-09 RX ORDER — OXYCODONE HYDROCHLORIDE AND ACETAMINOPHEN 5; 325 MG/1; MG/1
1 TABLET ORAL EVERY 6 HOURS PRN
Qty: 12 TABLET | Refills: 0 | Status: SHIPPED | OUTPATIENT
Start: 2019-03-09 | End: 2019-03-19

## 2019-03-09 RX ORDER — NAPROXEN 375 MG/1
375 TABLET ORAL 2 TIMES DAILY WITH MEALS
Qty: 12 TABLET | Refills: 0 | Status: SHIPPED | OUTPATIENT
Start: 2019-03-09 | End: 2019-10-19 | Stop reason: HOSPADM

## 2019-03-09 RX ORDER — HYDROCHLOROTHIAZIDE 25 MG/1
25 TABLET ORAL DAILY
COMMUNITY

## 2019-03-09 RX ORDER — PREDNISONE 20 MG/1
40 TABLET ORAL ONCE
Status: COMPLETED | OUTPATIENT
Start: 2019-03-09 | End: 2019-03-09

## 2019-03-09 RX ADMIN — KETOROLAC TROMETHAMINE 30 MG: 30 INJECTION, SOLUTION INTRAMUSCULAR at 16:54

## 2019-03-09 RX ADMIN — OXYCODONE AND ACETAMINOPHEN 1 TABLET: 5; 325 TABLET ORAL at 16:51

## 2019-03-09 RX ADMIN — PREDNISONE 40 MG: 20 TABLET ORAL at 16:51

## 2019-03-09 NOTE — ED PROVIDER NOTES
History  Chief Complaint   Patient presents with    Back Pain     patient fell backwards on Thursday, has had lower back pain since then, pain has been getting worse, pain radiates down into left leg     77-year-old male presents with complaints of low back pain radiating to the left into the buttocks after he fell earlier this week landing on his buttocks and low back  Patient denies any bowel or bladder dysfunction he is awake and alert is ambulatory with some pain in the low back  No other complaints      History provided by:  Patient and spouse   used: No        Prior to Admission Medications   Prescriptions Last Dose Informant Patient Reported? Taking? Pantoprazole Sodium (PROTONIX PO) 3/9/2019  Yes Yes   Sig: Take 40 mg by mouth 2 (two) times a day    carisoprodol (SOMA) 350 mg tablet 3/9/2019  Yes Yes   Sig: Take 350 mg by mouth every 6 (six) hours as needed for muscle spasms   hydrochlorothiazide (HYDRODIURIL) 25 mg tablet 3/9/2019  Yes Yes   Sig: Take 25 mg by mouth daily   oxyCODONE-acetaminophen (PERCOCET) 5-325 mg per tablet 3/9/2019  No Yes   Si-2 po every 6 hours prn severe pain      Facility-Administered Medications: None       Past Medical History:   Diagnosis Date    Atrial fibrillation (HCC)     Chronic pain     back pain    GERD (gastroesophageal reflux disease)        Past Surgical History:   Procedure Laterality Date    ANKLE SURGERY Right     APPENDECTOMY      CHOLECYSTECTOMY      ELBOW SURGERY      KNEE ARTHROPLASTY         History reviewed  No pertinent family history  I have reviewed and agree with the history as documented  Social History     Tobacco Use    Smoking status: Never Smoker    Smokeless tobacco: Current User     Types: Chew   Substance Use Topics    Alcohol use: No    Drug use: No        Review of Systems   Constitutional: Negative for activity change, chills, diaphoresis and fever     HENT: Negative for congestion, ear pain, nosebleeds, sore throat, trouble swallowing and voice change  Eyes: Negative for pain, discharge and redness  Respiratory: Negative for apnea, cough, choking, shortness of breath, wheezing and stridor  Cardiovascular: Negative for chest pain and palpitations  Gastrointestinal: Negative for abdominal distention, abdominal pain, constipation, diarrhea, nausea and vomiting  Endocrine: Negative for polydipsia  Genitourinary: Negative for difficulty urinating, dysuria, flank pain, frequency, hematuria and urgency  Musculoskeletal: Positive for back pain  Negative for gait problem, joint swelling, myalgias, neck pain and neck stiffness  Skin: Negative for pallor and rash  Neurological: Negative for dizziness, tremors, syncope, speech difficulty, weakness, numbness and headaches  Hematological: Negative for adenopathy  Psychiatric/Behavioral: Negative for confusion, hallucinations, self-injury and suicidal ideas  The patient is not nervous/anxious  Physical Exam  Physical Exam   Constitutional: He is oriented to person, place, and time  Vital signs are normal  He appears well-developed and well-nourished  HENT:   Head: Normocephalic and atraumatic  Right Ear: External ear normal    Left Ear: External ear normal    Nose: Nose normal    Mouth/Throat: Oropharynx is clear and moist    Eyes: Pupils are equal, round, and reactive to light  Conjunctivae and EOM are normal    Neck: Normal range of motion  Neck supple  Cardiovascular: Normal rate, regular rhythm, normal heart sounds and intact distal pulses  Pulmonary/Chest: Effort normal and breath sounds normal    Abdominal: Soft  Bowel sounds are normal    Musculoskeletal: He exhibits tenderness  Tenderness in the lower lumbar spine region on the left paravertebral radiating to the left buttocks  Neurological: He is alert and oriented to person, place, and time  Skin: Skin is warm  Nursing note and vitals reviewed        Vital Signs  ED Triage Vitals   Temperature Pulse Respirations Blood Pressure SpO2   03/09/19 1624 03/09/19 1626 03/09/19 1626 03/09/19 1626 03/09/19 1626   98 4 °F (36 9 °C) 84 18 124/78 96 %      Temp Source Heart Rate Source Patient Position - Orthostatic VS BP Location FiO2 (%)   03/09/19 1624 03/09/19 1626 03/09/19 1626 03/09/19 1626 --   Tympanic Monitor Sitting Right arm       Pain Score       03/09/19 1624       Worst Possible Pain           Vitals:    03/09/19 1626   BP: 124/78   Pulse: 84   Patient Position - Orthostatic VS: Sitting       Visual Acuity      ED Medications  Medications   oxyCODONE-acetaminophen (PERCOCET) 5-325 mg per tablet 1 tablet (1 tablet Oral Given 3/9/19 1651)   ketorolac (TORADOL) injection 30 mg (30 mg Intramuscular Given 3/9/19 1654)   predniSONE tablet 40 mg (40 mg Oral Given 3/9/19 1651)       Diagnostic Studies  Results Reviewed     None                 XR lumbar spine 2 or 3 views    (Results Pending)   XR pelvis ap only 1 or 2 views    (Results Pending)              Procedures  Procedures       Phone Contacts  ED Phone Contact    ED Course                               MDM    Disposition  Final diagnoses:   Acute left-sided low back pain with left-sided sciatica     Time reflects when diagnosis was documented in both MDM as applicable and the Disposition within this note     Time User Action Codes Description Comment    3/9/2019  5:52 PM Haylie Ghosh Add [M54 42] Acute left-sided low back pain with left-sided sciatica       ED Disposition     ED Disposition Condition Date/Time Comment    Discharge Stable Sat Mar 9, 2019  5:52 PM Alix Slight discharge to home/self care  Follow-up Information     Follow up With Specialties Details Why Contact Info    Shirin Godwin MD Internal Medicine Schedule an appointment as soon as possible for a visit in 1 day  [address] [facility]  [address]  [address] [place] [address]  [phone]            Patient's Medications Discharge Prescriptions    No medications on file     No discharge procedures on file      ED Provider  Electronically Signed by           Lexie London DO  03/09/19 5650

## 2019-04-12 ENCOUNTER — APPOINTMENT (OUTPATIENT)
Dept: URGENT CARE | Facility: CLINIC | Age: 44
End: 2019-04-12

## 2019-04-12 ENCOUNTER — APPOINTMENT (OUTPATIENT)
Dept: RADIOLOGY | Facility: CLINIC | Age: 44
End: 2019-04-12

## 2019-04-12 ENCOUNTER — TRANSCRIBE ORDERS (OUTPATIENT)
Dept: URGENT CARE | Facility: CLINIC | Age: 44
End: 2019-04-12
Payer: COMMERCIAL

## 2019-04-12 DIAGNOSIS — Z00.00 PE (PHYSICAL EXAM), ANNUAL: ICD-10-CM

## 2019-04-12 DIAGNOSIS — Z00.00 PE (PHYSICAL EXAM), ANNUAL: Primary | ICD-10-CM

## 2019-04-12 LAB
ALBUMIN SERPL BCP-MCNC: 3.8 G/DL (ref 3.5–5)
ALP SERPL-CCNC: 70 U/L (ref 46–116)
ALT SERPL W P-5'-P-CCNC: 33 U/L (ref 12–78)
ANION GAP SERPL CALCULATED.3IONS-SCNC: 7 MMOL/L (ref 4–13)
AST SERPL W P-5'-P-CCNC: 22 U/L (ref 5–45)
ATRIAL RATE: 63 BPM
BASOPHILS # BLD AUTO: 0.02 THOUSANDS/ΜL (ref 0–0.1)
BASOPHILS NFR BLD AUTO: 1 % (ref 0–1)
BILIRUB SERPL-MCNC: 0.62 MG/DL (ref 0.2–1)
BUN SERPL-MCNC: 12 MG/DL (ref 5–25)
CALCIUM SERPL-MCNC: 8.9 MG/DL (ref 8.3–10.1)
CHLORIDE SERPL-SCNC: 106 MMOL/L (ref 100–108)
CHOLEST SERPL-MCNC: 159 MG/DL (ref 50–200)
CO2 SERPL-SCNC: 28 MMOL/L (ref 21–32)
CREAT SERPL-MCNC: 1.27 MG/DL (ref 0.6–1.3)
EOSINOPHIL # BLD AUTO: 0.09 THOUSAND/ΜL (ref 0–0.61)
EOSINOPHIL NFR BLD AUTO: 2 % (ref 0–6)
ERYTHROCYTE [DISTWIDTH] IN BLOOD BY AUTOMATED COUNT: 12.9 % (ref 11.6–15.1)
GFR SERPL CREATININE-BSD FRML MDRD: 68 ML/MIN/1.73SQ M
GLUCOSE P FAST SERPL-MCNC: 79 MG/DL (ref 65–99)
HCT VFR BLD AUTO: 44.6 % (ref 36.5–49.3)
HDLC SERPL-MCNC: 46 MG/DL (ref 40–60)
HGB BLD-MCNC: 15.5 G/DL (ref 12–17)
IMM GRANULOCYTES # BLD AUTO: 0.02 THOUSAND/UL (ref 0–0.2)
IMM GRANULOCYTES NFR BLD AUTO: 1 % (ref 0–2)
LDLC SERPL CALC-MCNC: 89 MG/DL (ref 0–100)
LYMPHOCYTES # BLD AUTO: 1.58 THOUSANDS/ΜL (ref 0.6–4.47)
LYMPHOCYTES NFR BLD AUTO: 37 % (ref 14–44)
MCH RBC QN AUTO: 30.6 PG (ref 26.8–34.3)
MCHC RBC AUTO-ENTMCNC: 34.8 G/DL (ref 31.4–37.4)
MCV RBC AUTO: 88 FL (ref 82–98)
MONOCYTES # BLD AUTO: 0.45 THOUSAND/ΜL (ref 0.17–1.22)
MONOCYTES NFR BLD AUTO: 11 % (ref 4–12)
NEUTROPHILS # BLD AUTO: 2.1 THOUSANDS/ΜL (ref 1.85–7.62)
NEUTS SEG NFR BLD AUTO: 48 % (ref 43–75)
NONHDLC SERPL-MCNC: 113 MG/DL
NRBC BLD AUTO-RTO: 0 /100 WBCS
P AXIS: 16 DEGREES
PLATELET # BLD AUTO: 232 THOUSANDS/UL (ref 149–390)
PMV BLD AUTO: 10.6 FL (ref 8.9–12.7)
POTASSIUM SERPL-SCNC: 3.5 MMOL/L (ref 3.5–5.3)
PR INTERVAL: 144 MS
PROT SERPL-MCNC: 7.2 G/DL (ref 6.4–8.2)
QRS AXIS: 2 DEGREES
QRSD INTERVAL: 92 MS
QT INTERVAL: 440 MS
QTC INTERVAL: 450 MS
RBC # BLD AUTO: 5.06 MILLION/UL (ref 3.88–5.62)
SODIUM SERPL-SCNC: 141 MMOL/L (ref 136–145)
T WAVE AXIS: 13 DEGREES
TRIGL SERPL-MCNC: 119 MG/DL
VENTRICULAR RATE: 63 BPM
WBC # BLD AUTO: 4.26 THOUSAND/UL (ref 4.31–10.16)

## 2019-04-12 PROCEDURE — 80053 COMPREHEN METABOLIC PANEL: CPT

## 2019-04-12 PROCEDURE — 85025 COMPLETE CBC W/AUTO DIFF WBC: CPT | Performed by: PHYSICIAN ASSISTANT

## 2019-04-12 PROCEDURE — 80061 LIPID PANEL: CPT

## 2019-04-12 PROCEDURE — 93010 ELECTROCARDIOGRAM REPORT: CPT | Performed by: INTERNAL MEDICINE

## 2019-05-22 ENCOUNTER — APPOINTMENT (OUTPATIENT)
Dept: LAB | Facility: CLINIC | Age: 44
End: 2019-05-22
Payer: COMMERCIAL

## 2019-05-22 ENCOUNTER — TRANSCRIBE ORDERS (OUTPATIENT)
Dept: ADMINISTRATIVE | Facility: HOSPITAL | Age: 44
End: 2019-05-22

## 2019-05-22 DIAGNOSIS — A69.20 LYME DISEASE: Primary | ICD-10-CM

## 2019-05-22 DIAGNOSIS — A69.20 LYME DISEASE: ICD-10-CM

## 2019-05-22 LAB
ALBUMIN SERPL BCP-MCNC: 3.8 G/DL (ref 3.5–5)
ALP SERPL-CCNC: 73 U/L (ref 46–116)
ALT SERPL W P-5'-P-CCNC: 30 U/L (ref 12–78)
ANION GAP SERPL CALCULATED.3IONS-SCNC: 4 MMOL/L (ref 4–13)
AST SERPL W P-5'-P-CCNC: 16 U/L (ref 5–45)
BASOPHILS # BLD AUTO: 0.04 THOUSANDS/ΜL (ref 0–0.1)
BASOPHILS NFR BLD AUTO: 1 % (ref 0–1)
BILIRUB SERPL-MCNC: 0.42 MG/DL (ref 0.2–1)
BUN SERPL-MCNC: 12 MG/DL (ref 5–25)
CALCIUM SERPL-MCNC: 9.1 MG/DL (ref 8.3–10.1)
CHLORIDE SERPL-SCNC: 108 MMOL/L (ref 100–108)
CO2 SERPL-SCNC: 31 MMOL/L (ref 21–32)
CREAT SERPL-MCNC: 1.39 MG/DL (ref 0.6–1.3)
EOSINOPHIL # BLD AUTO: 0.1 THOUSAND/ΜL (ref 0–0.61)
EOSINOPHIL NFR BLD AUTO: 2 % (ref 0–6)
ERYTHROCYTE [DISTWIDTH] IN BLOOD BY AUTOMATED COUNT: 12.8 % (ref 11.6–15.1)
GFR SERPL CREATININE-BSD FRML MDRD: 61 ML/MIN/1.73SQ M
GLUCOSE SERPL-MCNC: 88 MG/DL (ref 65–140)
HCT VFR BLD AUTO: 48.7 % (ref 36.5–49.3)
HGB BLD-MCNC: 16.3 G/DL (ref 12–17)
IMM GRANULOCYTES # BLD AUTO: 0.01 THOUSAND/UL (ref 0–0.2)
IMM GRANULOCYTES NFR BLD AUTO: 0 % (ref 0–2)
LYMPHOCYTES # BLD AUTO: 1.98 THOUSANDS/ΜL (ref 0.6–4.47)
LYMPHOCYTES NFR BLD AUTO: 42 % (ref 14–44)
MCH RBC QN AUTO: 30.1 PG (ref 26.8–34.3)
MCHC RBC AUTO-ENTMCNC: 33.5 G/DL (ref 31.4–37.4)
MCV RBC AUTO: 90 FL (ref 82–98)
MONOCYTES # BLD AUTO: 0.38 THOUSAND/ΜL (ref 0.17–1.22)
MONOCYTES NFR BLD AUTO: 8 % (ref 4–12)
NEUTROPHILS # BLD AUTO: 2.2 THOUSANDS/ΜL (ref 1.85–7.62)
NEUTS SEG NFR BLD AUTO: 47 % (ref 43–75)
NRBC BLD AUTO-RTO: 0 /100 WBCS
PLATELET # BLD AUTO: 202 THOUSANDS/UL (ref 149–390)
PMV BLD AUTO: 11.3 FL (ref 8.9–12.7)
POTASSIUM SERPL-SCNC: 4.3 MMOL/L (ref 3.5–5.3)
PROT SERPL-MCNC: 7.3 G/DL (ref 6.4–8.2)
RBC # BLD AUTO: 5.42 MILLION/UL (ref 3.88–5.62)
SODIUM SERPL-SCNC: 143 MMOL/L (ref 136–145)
WBC # BLD AUTO: 4.71 THOUSAND/UL (ref 4.31–10.16)

## 2019-05-22 PROCEDURE — 85025 COMPLETE CBC W/AUTO DIFF WBC: CPT

## 2019-05-22 PROCEDURE — 36415 COLL VENOUS BLD VENIPUNCTURE: CPT

## 2019-05-22 PROCEDURE — 80053 COMPREHEN METABOLIC PANEL: CPT

## 2019-05-22 PROCEDURE — 86618 LYME DISEASE ANTIBODY: CPT

## 2019-05-23 LAB
B BURGDOR IGG SER IA-ACNC: 0.1
B BURGDOR IGM SER IA-ACNC: 0.4

## 2019-09-01 ENCOUNTER — HOSPITAL ENCOUNTER (EMERGENCY)
Facility: HOSPITAL | Age: 44
Discharge: HOME | End: 2019-09-01
Attending: EMERGENCY MEDICINE | Admitting: EMERGENCY MEDICINE
Payer: COMMERCIAL

## 2019-09-01 VITALS
RESPIRATION RATE: 16 BRPM | BODY MASS INDEX: 36.45 KG/M2 | HEIGHT: 78 IN | WEIGHT: 315 LBS | HEART RATE: 80 BPM | TEMPERATURE: 96.9 F | SYSTOLIC BLOOD PRESSURE: 151 MMHG | DIASTOLIC BLOOD PRESSURE: 71 MMHG | OXYGEN SATURATION: 96 %

## 2019-09-01 DIAGNOSIS — M10.9 GOUT: Primary | ICD-10-CM

## 2019-09-01 PROCEDURE — 99283 EMERGENCY DEPT VISIT LOW MDM: CPT

## 2019-09-01 RX ORDER — PREDNISONE 20 MG/1
40 TABLET ORAL DAILY
Qty: 8 TABLET | Refills: 0 | Status: SHIPPED | OUTPATIENT
Start: 2019-09-01 | End: 2019-09-05

## 2019-09-01 RX ORDER — HYDROCODONE BITARTRATE AND ACETAMINOPHEN 5; 325 MG/1; MG/1
1 TABLET ORAL EVERY 6 HOURS PRN
Qty: 15 TABLET | Refills: 0 | Status: SHIPPED | OUTPATIENT
Start: 2019-09-01 | End: 2019-10-19 | Stop reason: HOSPADM

## 2019-09-01 RX ORDER — PREDNISONE 20 MG/1
40 TABLET ORAL ONCE
Status: COMPLETED | OUTPATIENT
Start: 2019-09-01 | End: 2019-09-01

## 2019-09-01 RX ORDER — INDOMETHACIN 50 MG/1
50 CAPSULE ORAL
COMMUNITY

## 2019-09-01 RX ORDER — HYDROCODONE BITARTRATE AND ACETAMINOPHEN 5; 325 MG/1; MG/1
1 TABLET ORAL ONCE
Status: COMPLETED | OUTPATIENT
Start: 2019-09-01 | End: 2019-09-01

## 2019-09-01 RX ADMIN — HYDROCODONE BITARTRATE AND ACETAMINOPHEN 1 TABLET: 5; 325 TABLET ORAL at 00:57

## 2019-09-01 RX ADMIN — PREDNISONE 40 MG: 20 TABLET ORAL at 00:57

## 2019-09-01 NOTE — ED NOTES
Pt  Gave instructions about  Percocet not to use tylenol no driving or alcohol use at this time      Brock Shepherd, [address] [facility]  09/01/19 0719

## 2019-09-01 NOTE — ED PROVIDER NOTES
History  Chief Complaint   Patient presents with    Foot Swelling     pt reports few day hx of atraumatic right foot pain and swelling  hx of similiar episode in past "but its usually in the front of the foot and they said it was my gout and i take my medication every day" pt rpeorts tonight pain unbearable  tylenol last 8pm  indocin 8pm     Patient presents for evaluation of swelling and pain in right ankle/foot for the last 3 days  Taking indomethacin without relief  History of gout in the past  States couldn't take the pain any longer tonight  Denies injury or trauma  History provided by:  Patient   used: No        Prior to Admission Medications   Prescriptions Last Dose Informant Patient Reported? Taking?    Pantoprazole Sodium (PROTONIX PO)  at 0800 Self Yes No   Sig: Take 40 mg by mouth daily    carisoprodol (SOMA) 350 mg tablet Past Week at Unknown time Self Yes Yes   Sig: Take 350 mg by mouth 2 (two) times a day as needed for muscle spasms    hydrochlorothiazide (HYDRODIURIL) 25 mg tablet 2019 at 0800  Yes Yes   Sig: Take 25 mg by mouth daily   indomethacin (INDOCIN) 50 mg capsule 2019 at 1300 Self Yes Yes   Sig: Take 50 mg by mouth 3 (three) times a day with meals   naproxen (NAPROSYN) 375 mg tablet Not Taking at Unknown time  No No   Sig: Take 1 tablet (375 mg total) by mouth 2 (two) times a day with meals   Patient not taking: Reported on 2019   oxyCODONE-acetaminophen (PERCOCET) 5-325 mg per tablet Not Taking at Unknown time  No No   Si-2 po every 6 hours prn severe pain   Patient not taking: Reported on 2019      Facility-Administered Medications: None       Past Medical History:   Diagnosis Date    Atrial fibrillation (HCC)     Chronic pain     back pain    GERD (gastroesophageal reflux disease)     Gout        Past Surgical History:   Procedure Laterality Date    ANKLE SURGERY Right     APPENDECTOMY      CHOLECYSTECTOMY      ELBOW SURGERY      KNEE ARTHROPLASTY         History reviewed  No pertinent family history  I have reviewed and agree with the history as documented  Social History     Tobacco Use    Smoking status: Never Smoker    Smokeless tobacco: Current User     Types: Chew   Substance Use Topics    Alcohol use: Never     Frequency: Never    Drug use: No        Review of Systems   Musculoskeletal: Positive for arthralgias  All other systems reviewed and are negative  Physical Exam  Physical Exam   Constitutional: He is oriented to person, place, and time  No distress  Cardiovascular: Normal rate, regular rhythm and intact distal pulses  Pulmonary/Chest: Effort normal and breath sounds normal  No respiratory distress  Abdominal: Soft  There is no tenderness  Musculoskeletal: Normal range of motion  He exhibits edema and tenderness  Feet:    Neurological: He is alert and oriented to person, place, and time  Skin: Capillary refill takes less than 2 seconds  He is not diaphoretic  Nursing note and vitals reviewed        Vital Signs  ED Triage Vitals [09/01/19 0042]   Temperature Pulse Respirations Blood Pressure SpO2   (!) 96 9 °F (36 1 °C) 80 16 151/71 96 %      Temp Source Heart Rate Source Patient Position - Orthostatic VS BP Location FiO2 (%)   Tympanic Monitor Sitting Right arm --      Pain Score       Worst Possible Pain           Vitals:    09/01/19 0042   BP: 151/71   Pulse: 80   Patient Position - Orthostatic VS: Sitting         Visual Acuity      ED Medications  Medications   predniSONE tablet 40 mg (40 mg Oral Given 9/1/19 0057)   HYDROcodone-acetaminophen (NORCO) 5-325 mg per tablet 1 tablet (1 tablet Oral Given 9/1/19 0057)       Diagnostic Studies  Results Reviewed     None                 No orders to display              Procedures  Procedures       ED Course                               MDM  Number of Diagnoses or Management Options  Gout:   Diagnosis management comments: Pulse ox 96% on RA indicating adequate oxygenation    NJRx reviewed        Amount and/or Complexity of Data Reviewed  Decide to obtain previous medical records or to obtain history from someone other than the patient: yes  Review and summarize past medical records: yes    Patient Progress  Patient progress: stable      Disposition  Final diagnoses:   Gout     Time reflects when diagnosis was documented in both MDM as applicable and the Disposition within this note     Time User Action Codes Description Comment    9/1/2019  1:11 AM Sam Herrera Add [M10 9] Gout       ED Disposition     ED Disposition Condition Date/Time Comment    Discharge Stable Sun Sep 1, 2019  1:11 AM Poonam Vázquez discharge to home/self care  Follow-up Information     Follow up With Specialties Details Why Contact Info    Jamaica Martinez MD Internal Medicine In 3 days  [address] [facility]  [address]  [address] [place] [address]  [phone]            Patient's Medications   Discharge Prescriptions    HYDROCODONE-ACETAMINOPHEN (NORCO) 5-325 MG PER TABLET    Take 1 tablet by mouth every 6 (six) hours as needed for pain for up to 15 dosesMax Daily Amount: 4 tablets       Start Date: 9/1/2019  End Date: --       Order Dose: 1 tablet       Quantity: 15 tablet    Refills: 0    PREDNISONE 20 MG TABLET    Take 2 tablets (40 mg total) by mouth daily for 4 days       Start Date: 9/1/2019  End Date: 9/5/2019       Order Dose: 40 mg       Quantity: 8 tablet    Refills: 0     No discharge procedures on file      ED Provider  Electronically Signed by           Brant Tsai DO  09/01/19 5477

## 2019-09-17 NOTE — ED PROVIDER NOTES
History  Chief Complaint   Patient presents with    Elbow Pain     c/o L elbow pain, swelling, redness, "ball of fluid" began a few days ago, denies trauma     38year old male hx gout presents with L elbow swelling x 2 days  He states there is a ball of swelling on the outside of his elbow which is painful to touch  He may have banged his elbow against something at home, he is unsure  The elbow is not red or warm to touch  There is no arm or hand swelling or redness  He has tried motrin and tylenol at home without relief  He denies any fever, chills, cough, sore throat, headache, nausea, vomiting  Prior to Admission Medications   Prescriptions Last Dose Informant Patient Reported? Taking? Pantoprazole Sodium (PROTONIX PO)   Yes No   Sig: Take 40 mg by mouth daily   aspirin 81 mg chewable tablet   Yes No   Sig: Chew 1 tablet daily   carisoprodol (SOMA) 350 mg tablet   Yes No   Sig: Take 350 mg by mouth every 6 (six) hours as needed for muscle spasms   diazepam (VALIUM) 5 mg tablet   No No   Sig: Take 1 tablet (5 mg total) by mouth 2 (two) times a day for 3 days   oxyCODONE-acetaminophen (PERCOCET) 5-325 mg per tablet   No No   Si-2 po every 6 hours prn severe pain   Patient not taking: Reported on 2018    predniSONE 10 mg tablet   No No   Si po daily x2 days, then 3 po daily x2 days, then 2 po daily x2 days,then 1 po daily x2days   Patient not taking: Reported on 2018       Facility-Administered Medications: None       Past Medical History:   Diagnosis Date    Atrial fibrillation (HCC)     Chronic pain     back pain    GERD (gastroesophageal reflux disease)        Past Surgical History:   Procedure Laterality Date    ANKLE SURGERY Right     APPENDECTOMY      CHOLECYSTECTOMY      KNEE ARTHROPLASTY         History reviewed  No pertinent family history  I have reviewed and agree with the history as documented      Social History   Substance Use Topics    Smoking status: Never Smoker    Smokeless tobacco: Current User     Types: Chew    Alcohol use No        Review of Systems   Constitutional: Negative for chills and fever  HENT: Negative for sneezing and sore throat  Eyes: Negative for photophobia and visual disturbance  Respiratory: Negative for cough and shortness of breath  Cardiovascular: Negative for chest pain, palpitations and leg swelling  Gastrointestinal: Negative for abdominal pain, constipation, diarrhea, nausea and vomiting  Musculoskeletal: Positive for joint swelling and myalgias  Negative for arthralgias, back pain and gait problem  Skin: Negative for color change, pallor, rash and wound  Neurological: Negative for dizziness, syncope, weakness, light-headedness, numbness and headaches  Psychiatric/Behavioral: Negative for agitation  All other systems reviewed and are negative  Physical Exam  Physical Exam   Constitutional: He appears well-developed and well-nourished  No distress  HENT:   Head: Normocephalic and atraumatic  Nose: Nose normal    Eyes: EOM are normal    Neck: Normal range of motion  Cardiovascular: Normal rate, regular rhythm, normal heart sounds and intact distal pulses  Exam reveals no gallop and no friction rub  No murmur heard  Pulmonary/Chest: Effort normal and breath sounds normal  No respiratory distress  He has no wheezes  He has no rales  Sp02 is 95% indicating adequate oxygenation on room air   Musculoskeletal:        Arms:  Skin: Skin is warm and dry  No rash noted  He is not diaphoretic  No erythema  No pallor  Nursing note and vitals reviewed        Vital Signs  ED Triage Vitals [11/17/18 1109]   Temperature Pulse Respirations Blood Pressure SpO2   97 5 °F (36 4 °C) 70 14 133/66 95 %      Temp Source Heart Rate Source Patient Position - Orthostatic VS BP Location FiO2 (%)   Oral Monitor Sitting Right arm --      Pain Score       Worst Possible Pain           Vitals:    11/17/18 1109   BP: 133/66 Pulse: 70   Patient Position - Orthostatic VS: Sitting       Visual Acuity      ED Medications  Medications   ketorolac (TORADOL) injection 15 mg (15 mg Intramuscular Given 11/17/18 1131)       Diagnostic Studies  Results Reviewed     None                 XR elbow 3+ views LEFT   Final Result by Ingrid Guevara MD (11/17 1336)      No acute osseous abnormality  Workstation performed: [id number]                    Procedures  Procedures       Phone Contacts  ED Phone Contact    ED Course                               MDM  Number of Diagnoses or Management Options  Olecranon bursitis:   Diagnosis management comments: Suspect olecranon bursitis, does not appear infectious  There is no lymphadenitis, tracking, fevers  xrays no acute osseous deformity  Explained these often wax and wane in size  Advised to use ACE wrap, rest, ice, and anti-inflammatories as needed for pain  Patient requesting stronger pain medication, motrin, tylenol, and toradol did not relieve pain, will give 2 day course of percocet, patient tolerated it well in the past without allergic reaction  Follow up with orthopedics if persists, may need further intervention  Gave patient proper education regarding diagnosis  Answered all questions  Return to ED for any worsening of symptoms otherwise follow up with primary care physician for re-evaluation  Discussed plan with patient who verbalized understanding and agreed to plan         Amount and/or Complexity of Data Reviewed  Tests in the radiology section of CPT®: ordered and reviewed  Discussion of test results with the performing providers: yes  Discuss the patient with other providers: yes (Discussed case with Dr Natty Tripathi)  Independent visualization of images, tracings, or specimens: yes      CritCare Time    Disposition  Final diagnoses:   Olecranon bursitis     Time reflects when diagnosis was documented in both MDM as applicable and the Disposition within this note     Time User Action Codes Description Comment    11/17/2018 11:50 AM Domingo Gave Add [M70 20] Olecranon bursitis       ED Disposition     ED Disposition Condition Comment    Discharge  Enrike Horne discharge to home/self care  Condition at discharge: Good        Follow-up Information     Follow up With Specialties Details Why Contact Info Additional [facility] Call today to make follow up appointment for further evaluation of elbow pain [address] [address]  [address]26       Jj Jensen MD Internal Medicine Schedule an appointment as soon as possible for a visit in 3 days If symptoms worsen [address] [facility]  [address]  [address] [place] [address]  [address] [facility] Emergency Department Emergency Medicine Go to As needed [address] [facility] [address]  [phone] [facility], [facility] [address]          Discharge Medication List as of 11/17/2018 11:57 AM      START taking these medications    Details   !! oxyCODONE-acetaminophen (PERCOCET) 5-325 mg per tablet Take 1 tablet by mouth every 8 (eight) hours as needed for moderate pain for up to 2 days Max Daily Amount: 3 tablets, Starting Sat 11/17/2018, Until Mon 11/19/2018, Print       !! - Potential duplicate medications found  Please discuss with provider        CONTINUE these medications which have NOT CHANGED    Details   aspirin 81 mg chewable tablet Chew 1 tablet daily, Starting Mon 2/6/2017, Historical Med      carisoprodol (SOMA) 350 mg tablet Take 350 mg by mouth every 6 (six) hours as needed for muscle spasms, Historical Med      diazepam (VALIUM) 5 mg tablet Take 1 tablet (5 mg total) by mouth 2 (two) times a day for 3 days, Starting Fri 2/2/2018, Until Mon 2/5/2018, Print      !! oxyCODONE-acetaminophen (PERCOCET) 5-325 mg per tablet 1-2 po every 6 hours prn severe pain, Print      Pantoprazole Sodium (PROTONIX PO) Take 40 mg by mouth daily, Historical Med      predniSONE 10 mg tablet 4 po daily x2 days, then 3 po daily x2 days, then 2 po daily x2 days,then 1 po daily x2days, Print       !! - Potential duplicate medications found  Please discuss with provider  No discharge procedures on file      ED Provider  Electronically Signed by           Jan Albert PA-C  11/17/18 0922 Wound Care: [place]

## 2019-10-15 ENCOUNTER — TELEPHONE (OUTPATIENT)
Dept: VASCULAR SURGERY | Facility: CLINIC | Age: 44
End: 2019-10-15

## 2019-10-15 DIAGNOSIS — I71.4 ABDOMINAL AORTIC ANEURYSM WITHOUT RUPTURE (HCC): Primary | ICD-10-CM

## 2019-10-17 ENCOUNTER — APPOINTMENT (OUTPATIENT)
Dept: RADIOLOGY | Facility: HOSPITAL | Age: 44
End: 2019-10-17
Payer: COMMERCIAL

## 2019-10-17 ENCOUNTER — APPOINTMENT (EMERGENCY)
Dept: RADIOLOGY | Facility: HOSPITAL | Age: 44
End: 2019-10-17
Payer: COMMERCIAL

## 2019-10-17 ENCOUNTER — HOSPITAL ENCOUNTER (OUTPATIENT)
Facility: HOSPITAL | Age: 44
Setting detail: OBSERVATION
Discharge: HOME | End: 2019-10-19
Attending: EMERGENCY MEDICINE | Admitting: INTERNAL MEDICINE
Payer: COMMERCIAL

## 2019-10-17 DIAGNOSIS — Z72.0 NICOTINE ABUSE: ICD-10-CM

## 2019-10-17 DIAGNOSIS — R42 DIZZINESS: Primary | ICD-10-CM

## 2019-10-17 PROBLEM — I10 HYPERTENSION: Status: ACTIVE | Noted: 2019-10-17

## 2019-10-17 PROBLEM — R06.00 EXERTIONAL DYSPNEA: Status: ACTIVE | Noted: 2019-10-17

## 2019-10-17 PROBLEM — K21.9 GERD (GASTROESOPHAGEAL REFLUX DISEASE): Status: ACTIVE | Noted: 2019-10-17

## 2019-10-17 PROBLEM — R06.09 EXERTIONAL DYSPNEA: Status: ACTIVE | Noted: 2019-10-17

## 2019-10-17 PROBLEM — I77.72 ILIAC ARTERY DISSECTION (HCC): Status: ACTIVE | Noted: 2019-10-17

## 2019-10-17 PROBLEM — I48.91 ATRIAL FIBRILLATION (HCC): Status: ACTIVE | Noted: 2019-10-17

## 2019-10-17 LAB
ALBUMIN SERPL BCP-MCNC: 3.6 G/DL (ref 3.5–5)
ALP SERPL-CCNC: 65 U/L (ref 46–116)
ALT SERPL W P-5'-P-CCNC: 27 U/L (ref 12–78)
AMPHETAMINES SERPL QL SCN: NEGATIVE
ANION GAP SERPL CALCULATED.3IONS-SCNC: 8 MMOL/L (ref 4–13)
APTT PPP: 27 SECONDS (ref 25–32)
AST SERPL W P-5'-P-CCNC: 16 U/L (ref 5–45)
BARBITURATES UR QL: NEGATIVE
BASOPHILS # BLD AUTO: 0.03 THOUSANDS/ΜL (ref 0–0.1)
BASOPHILS NFR BLD AUTO: 1 % (ref 0–1)
BENZODIAZ UR QL: NEGATIVE
BILIRUB SERPL-MCNC: 0.5 MG/DL (ref 0.2–1)
BILIRUB UR QL STRIP: NEGATIVE
BUN SERPL-MCNC: 8 MG/DL (ref 5–25)
CALCIUM SERPL-MCNC: 8.9 MG/DL (ref 8.3–10.1)
CHLORIDE SERPL-SCNC: 106 MMOL/L (ref 100–108)
CHOLEST SERPL-MCNC: 156 MG/DL (ref 50–200)
CLARITY UR: CLEAR
CO2 SERPL-SCNC: 28 MMOL/L (ref 21–32)
COCAINE UR QL: NEGATIVE
COLOR UR: YELLOW
CREAT SERPL-MCNC: 1.14 MG/DL (ref 0.6–1.3)
EOSINOPHIL # BLD AUTO: 0.09 THOUSAND/ΜL (ref 0–0.61)
EOSINOPHIL NFR BLD AUTO: 2 % (ref 0–6)
ERYTHROCYTE [DISTWIDTH] IN BLOOD BY AUTOMATED COUNT: 12.5 % (ref 11.6–15.1)
GFR SERPL CREATININE-BSD FRML MDRD: 77 ML/MIN/1.73SQ M
GLUCOSE SERPL-MCNC: 100 MG/DL (ref 65–140)
GLUCOSE UR STRIP-MCNC: NEGATIVE MG/DL
HCT VFR BLD AUTO: 46.9 % (ref 36.5–49.3)
HDLC SERPL-MCNC: 47 MG/DL (ref 40–60)
HGB BLD-MCNC: 15.5 G/DL (ref 12–17)
HGB UR QL STRIP.AUTO: NEGATIVE
IMM GRANULOCYTES # BLD AUTO: 0.02 THOUSAND/UL (ref 0–0.2)
IMM GRANULOCYTES NFR BLD AUTO: 0 % (ref 0–2)
INR PPP: 0.98 (ref 0.91–1.09)
KETONES UR STRIP-MCNC: NEGATIVE MG/DL
LDLC SERPL CALC-MCNC: 88 MG/DL (ref 0–100)
LEUKOCYTE ESTERASE UR QL STRIP: NEGATIVE
LYMPHOCYTES # BLD AUTO: 1.76 THOUSANDS/ΜL (ref 0.6–4.47)
LYMPHOCYTES NFR BLD AUTO: 36 % (ref 14–44)
MAGNESIUM SERPL-MCNC: 2.2 MG/DL (ref 1.6–2.6)
MCH RBC QN AUTO: 29.8 PG (ref 26.8–34.3)
MCHC RBC AUTO-ENTMCNC: 33 G/DL (ref 31.4–37.4)
MCV RBC AUTO: 90 FL (ref 82–98)
METHADONE UR QL: NEGATIVE
MONOCYTES # BLD AUTO: 0.37 THOUSAND/ΜL (ref 0.17–1.22)
MONOCYTES NFR BLD AUTO: 8 % (ref 4–12)
NEUTROPHILS # BLD AUTO: 2.6 THOUSANDS/ΜL (ref 1.85–7.62)
NEUTS SEG NFR BLD AUTO: 53 % (ref 43–75)
NITRITE UR QL STRIP: NEGATIVE
NONHDLC SERPL-MCNC: 109 MG/DL
NRBC BLD AUTO-RTO: 0 /100 WBCS
OPIATES UR QL SCN: NEGATIVE
PCP UR QL: NEGATIVE
PH UR STRIP.AUTO: 6 [PH]
PLATELET # BLD AUTO: 216 THOUSANDS/UL (ref 149–390)
PMV BLD AUTO: 10.1 FL (ref 8.9–12.7)
POTASSIUM SERPL-SCNC: 3.6 MMOL/L (ref 3.5–5.3)
PROT SERPL-MCNC: 7.1 G/DL (ref 6.4–8.2)
PROT UR STRIP-MCNC: NEGATIVE MG/DL
PROTHROMBIN TIME: 10.5 SECONDS (ref 9.8–12)
RBC # BLD AUTO: 5.2 MILLION/UL (ref 3.88–5.62)
SODIUM SERPL-SCNC: 142 MMOL/L (ref 136–145)
SP GR UR STRIP.AUTO: 1.01 (ref 1–1.03)
THC UR QL: NEGATIVE
TRIGL SERPL-MCNC: 106 MG/DL
TROPONIN I SERPL-MCNC: <0.02 NG/ML
TSH SERPL DL<=0.05 MIU/L-ACNC: 2.92 UIU/ML (ref 0.36–3.74)
UROBILINOGEN UR QL STRIP.AUTO: 0.2 E.U./DL
WBC # BLD AUTO: 4.87 THOUSAND/UL (ref 4.31–10.16)

## 2019-10-17 PROCEDURE — 80053 COMPREHEN METABOLIC PANEL: CPT | Performed by: EMERGENCY MEDICINE

## 2019-10-17 PROCEDURE — 71045 X-RAY EXAM CHEST 1 VIEW: CPT

## 2019-10-17 PROCEDURE — 99285 EMERGENCY DEPT VISIT HI MDM: CPT

## 2019-10-17 PROCEDURE — 84484 ASSAY OF TROPONIN QUANT: CPT | Performed by: INTERNAL MEDICINE

## 2019-10-17 PROCEDURE — 96361 HYDRATE IV INFUSION ADD-ON: CPT

## 2019-10-17 PROCEDURE — 80061 LIPID PANEL: CPT | Performed by: INTERNAL MEDICINE

## 2019-10-17 PROCEDURE — 96374 THER/PROPH/DIAG INJ IV PUSH: CPT

## 2019-10-17 PROCEDURE — 84443 ASSAY THYROID STIM HORMONE: CPT | Performed by: EMERGENCY MEDICINE

## 2019-10-17 PROCEDURE — 83735 ASSAY OF MAGNESIUM: CPT | Performed by: EMERGENCY MEDICINE

## 2019-10-17 PROCEDURE — 96375 TX/PRO/DX INJ NEW DRUG ADDON: CPT

## 2019-10-17 PROCEDURE — 70496 CT ANGIOGRAPHY HEAD: CPT

## 2019-10-17 PROCEDURE — 85610 PROTHROMBIN TIME: CPT | Performed by: EMERGENCY MEDICINE

## 2019-10-17 PROCEDURE — 87081 CULTURE SCREEN ONLY: CPT | Performed by: INTERNAL MEDICINE

## 2019-10-17 PROCEDURE — 99220 PR INITIAL OBSERVATION CARE/DAY 70 MINUTES: CPT | Performed by: INTERNAL MEDICINE

## 2019-10-17 PROCEDURE — 85025 COMPLETE CBC W/AUTO DIFF WBC: CPT | Performed by: EMERGENCY MEDICINE

## 2019-10-17 PROCEDURE — 84484 ASSAY OF TROPONIN QUANT: CPT | Performed by: EMERGENCY MEDICINE

## 2019-10-17 PROCEDURE — 81003 URINALYSIS AUTO W/O SCOPE: CPT | Performed by: EMERGENCY MEDICINE

## 2019-10-17 PROCEDURE — 70498 CT ANGIOGRAPHY NECK: CPT

## 2019-10-17 PROCEDURE — 87147 CULTURE TYPE IMMUNOLOGIC: CPT | Performed by: INTERNAL MEDICINE

## 2019-10-17 PROCEDURE — 85730 THROMBOPLASTIN TIME PARTIAL: CPT | Performed by: EMERGENCY MEDICINE

## 2019-10-17 PROCEDURE — 80307 DRUG TEST PRSMV CHEM ANLYZR: CPT | Performed by: EMERGENCY MEDICINE

## 2019-10-17 PROCEDURE — 93005 ELECTROCARDIOGRAM TRACING: CPT

## 2019-10-17 PROCEDURE — 36415 COLL VENOUS BLD VENIPUNCTURE: CPT | Performed by: EMERGENCY MEDICINE

## 2019-10-17 RX ORDER — METHYLPREDNISOLONE SODIUM SUCCINATE 125 MG/2ML
60 INJECTION, POWDER, LYOPHILIZED, FOR SOLUTION INTRAMUSCULAR; INTRAVENOUS ONCE
Status: COMPLETED | OUTPATIENT
Start: 2019-10-17 | End: 2019-10-17

## 2019-10-17 RX ORDER — ONDANSETRON 2 MG/ML
4 INJECTION INTRAMUSCULAR; INTRAVENOUS EVERY 6 HOURS PRN
Status: DISCONTINUED | OUTPATIENT
Start: 2019-10-17 | End: 2019-10-19 | Stop reason: HOSPADM

## 2019-10-17 RX ORDER — SODIUM CHLORIDE 9 MG/ML
125 INJECTION, SOLUTION INTRAVENOUS CONTINUOUS
Status: DISCONTINUED | OUTPATIENT
Start: 2019-10-17 | End: 2019-10-18

## 2019-10-17 RX ORDER — DIAZEPAM 5 MG/ML
10 INJECTION, SOLUTION INTRAMUSCULAR; INTRAVENOUS ONCE
Status: COMPLETED | OUTPATIENT
Start: 2019-10-17 | End: 2019-10-17

## 2019-10-17 RX ORDER — NICOTINE 21 MG/24HR
1 PATCH, TRANSDERMAL 24 HOURS TRANSDERMAL DAILY
Status: DISCONTINUED | OUTPATIENT
Start: 2019-10-18 | End: 2019-10-19 | Stop reason: HOSPADM

## 2019-10-17 RX ORDER — MECLIZINE HYDROCHLORIDE 25 MG/1
25 TABLET ORAL EVERY 8 HOURS SCHEDULED
Status: DISCONTINUED | OUTPATIENT
Start: 2019-10-17 | End: 2019-10-19 | Stop reason: HOSPADM

## 2019-10-17 RX ORDER — ONDANSETRON 2 MG/ML
4 INJECTION INTRAMUSCULAR; INTRAVENOUS ONCE
Status: COMPLETED | OUTPATIENT
Start: 2019-10-17 | End: 2019-10-17

## 2019-10-17 RX ORDER — PANTOPRAZOLE SODIUM 40 MG/1
40 TABLET, DELAYED RELEASE ORAL DAILY
Status: DISCONTINUED | OUTPATIENT
Start: 2019-10-18 | End: 2019-10-19 | Stop reason: HOSPADM

## 2019-10-17 RX ORDER — PREDNISONE 20 MG/1
40 TABLET ORAL DAILY
Status: COMPLETED | OUTPATIENT
Start: 2019-10-18 | End: 2019-10-18

## 2019-10-17 RX ORDER — MECLIZINE HYDROCHLORIDE 25 MG/1
50 TABLET ORAL ONCE
Status: COMPLETED | OUTPATIENT
Start: 2019-10-17 | End: 2019-10-17

## 2019-10-17 RX ORDER — LANOLIN ALCOHOL/MO/W.PET/CERES
3 CREAM (GRAM) TOPICAL
Status: DISCONTINUED | OUTPATIENT
Start: 2019-10-17 | End: 2019-10-19 | Stop reason: HOSPADM

## 2019-10-17 RX ORDER — INDOMETHACIN 50 MG/1
50 CAPSULE ORAL
Status: DISCONTINUED | OUTPATIENT
Start: 2019-10-17 | End: 2019-10-17

## 2019-10-17 RX ADMIN — DIAZEPAM 10 MG: 10 INJECTION, SOLUTION INTRAMUSCULAR; INTRAVENOUS at 16:09

## 2019-10-17 RX ADMIN — ONDANSETRON 4 MG: 2 INJECTION INTRAMUSCULAR; INTRAVENOUS at 13:44

## 2019-10-17 RX ADMIN — MELATONIN 3 MG: at 23:35

## 2019-10-17 RX ADMIN — SODIUM CHLORIDE 1000 ML: 0.9 INJECTION, SOLUTION INTRAVENOUS at 14:04

## 2019-10-17 RX ADMIN — IOHEXOL 85 ML: 350 INJECTION, SOLUTION INTRAVENOUS at 14:22

## 2019-10-17 RX ADMIN — SODIUM CHLORIDE 125 ML/HR: 0.9 INJECTION, SOLUTION INTRAVENOUS at 17:55

## 2019-10-17 RX ADMIN — MECLIZINE HYDROCHLORIDE 25 MG: 25 TABLET ORAL at 21:51

## 2019-10-17 RX ADMIN — METHYLPREDNISOLONE SODIUM SUCCINATE 60 MG: 125 INJECTION, POWDER, FOR SOLUTION INTRAMUSCULAR; INTRAVENOUS at 16:09

## 2019-10-17 RX ADMIN — ONDANSETRON 4 MG: 2 INJECTION INTRAMUSCULAR; INTRAVENOUS at 13:40

## 2019-10-17 RX ADMIN — MECLIZINE HYDROCHLORIDE 50 MG: 25 TABLET ORAL at 13:41

## 2019-10-17 NOTE — H&P
H&P- Kendrick Rued 1975, 37 y o  male MRN: [id number]    Unit/Bed#: [address] [place] [address] Encounter: [id number]    Primary Care Provider: Jorje Sotelo MD   Date and time admitted to hospital: 10/17/2019  1:13 PM        * Dizziness  Assessment & Plan  Most likely secondary to benign positional vertigo  Cannot rule out posterior circulation stroke given his history of atrial fibrillation  Patient received Solu-Medrol, meclizine and Valium in the ED  Continue meclizine 25 milligram p o  T i d   Will get PT/OT evaluation  Patient had CTA of the head and neck in the ED which was negative  Will get MRI of the brain  Will also check 2D echo with bubble study  Check orthostatic vital signs    Atrial fibrillation [facility])  Assessment & Plan  Patient has history of atrial fibrillation status post cardioversion  Patient does report intermittent palpitations which improve with Valsalva maneuvers  Patient currently in normal sinus rhythm  Exertional dyspnea  Assessment & Plan  Patient reported exertional shortness of breath and leg swelling recently  Pulmonary exam showed clear lungs  Will get chest x-ray  Will also order 2D echo to assess EF and rule out valvular abnormalities    Hypertension  Assessment & Plan  Hold hydrochlorothiazide as patient's blood pressure is borderline low  Check orthostatic vital signs    GERD (gastroesophageal reflux disease)  Assessment & Plan  Continue Protonix    Iliac artery dissection [facility])  Assessment & Plan  Patient has history of right iliac artery dissection being followed by vascular surgery as outpatient    VTE Prophylaxis: Enoxaparin (Lovenox)  / reason for no mechanical VTE prophylaxis Patient on Lovenox   Code Status: Level 1 - Full Code    Anticipated Length of Stay:  Patient will be admitted on an Observation basis with an anticipated length of stay of  less than 2 midnights     Justification for Hospital Stay:  Dizziness, exertional shortness of breath    Total Time for Visit, including Counseling / Coordination of Care: 1 hour  Greater than 50% of this total time spent on direct patient counseling and coordination of care  Chief Complaint:   Dizziness (dizziness since last night, feels lightheaded or "like drunk", also complains of headache and nausea)      History of Present Illness:    Trenton Ibarra is a 37 y o  male with a PMH of hypertension, gout, GERD, atrial fibrillation,, Iliac  artery dissection who presents with dizziness since last night  Patient reported spinning sensation with started last night while he was sitting  Spinning sensation persistent throughout the night  The dizziness was worse with movement and patient also felt ataxic  The symptoms continued due to persist today associated with some nausea and patient present to the ED  Patient was given meclizine, Valium and Solu-Medrol without much improvement in symptoms  Patient also reported some blurred vision but patient was not clear if the blurred vision is due to lack of losses  Patient denies any diplopia, ringing in the ears, weakness, tingling or numbness  Patient reported that he has been having ache like pain in the left ear for almost 3 months but intermittently has sharp pain  Patient also reported bilateral leg swelling over the past 1 week which is worse during the day  Patient also complains of exertional shortness of breath over the past week or 2  Patient reported that his history of atrial fibrillation and does get intermittent palpitations but patient tries to subside the symptoms with Valsalva maneuvers  Patient denies any chest pain, abdominal pain, nausea or vomiting  Review of Systems:    Review of Systems   Constitutional: Negative for chills, diaphoresis, fatigue and unexpected weight change  HENT: Positive for ear pain   Negative for congestion, ear discharge, facial swelling, hearing loss, mouth sores, nosebleeds, postnasal drip, rhinorrhea, sinus pressure, sneezing, sore throat, tinnitus, trouble swallowing and voice change  Eyes: Negative for photophobia, discharge, redness and visual disturbance  Respiratory: Positive for shortness of breath  Negative for cough, chest tightness, wheezing and stridor  Cardiovascular: Positive for palpitations and leg swelling  Negative for chest pain  Gastrointestinal: Negative for abdominal distention, abdominal pain, anal bleeding, blood in stool, constipation, diarrhea, nausea and vomiting  Endocrine: Negative for polydipsia, polyphagia and polyuria  Genitourinary: Negative for decreased urine volume, difficulty urinating, dysuria, flank pain, frequency, hematuria and urgency  Musculoskeletal: Negative for arthralgias, back pain and neck stiffness  Skin: Negative for pallor and rash  Neurological: Positive for dizziness  Negative for seizures, facial asymmetry, speech difficulty, light-headedness, numbness and headaches  Difficulty walking   Hematological: Negative for adenopathy  Does not bruise/bleed easily  Psychiatric/Behavioral: Negative for agitation and confusion  Past Medical and Surgical History:     Past Medical History:   Diagnosis Date    Atrial fibrillation (HCC)     Chronic pain     back pain    GERD (gastroesophageal reflux disease)     Gout     Hypertension     Iliac artery dissection (HCC)        Past Surgical History:   Procedure Laterality Date    ANKLE SURGERY Right     APPENDECTOMY      CHOLECYSTECTOMY      ELBOW SURGERY      KNEE ARTHROPLASTY         Meds/Allergies:    Prior to Admission medications    Medication Sig Start Date End Date Taking?  Authorizing Provider   hydrochlorothiazide (HYDRODIURIL) 25 mg tablet Take 25 mg by mouth daily   Yes Historical Provider, MD   indomethacin (INDOCIN) 50 mg capsule Take 50 mg by mouth 3 (three) times a day with meals   Yes Historical Provider, MD   Pantoprazole Sodium (PROTONIX PO) Take 40 mg by mouth daily    Yes Historical Provider, MD carisoprodol (SOMA) 350 mg tablet Take 350 mg by mouth 2 (two) times a day as needed for muscle spasms     Historical Provider, MD   HYDROcodone-acetaminophen (NORCO) 5-325 mg per tablet Take 1 tablet by mouth every 6 (six) hours as needed for pain for up to 15 dosesMax Daily Amount: 4 tablets  Patient not taking: Reported on 10/17/2019 9/1/19   Ayo Kerns DO   naproxen (NAPROSYN) 375 mg tablet Take 1 tablet (375 mg total) by mouth 2 (two) times a day with meals  Patient not taking: Reported on 9/1/2019 3/9/19   Blanca Vargas DO   oxyCODONE-acetaminophen (PERCOCET) 5-325 mg per tablet 1-2 po every 6 hours prn severe pain  Patient not taking: Reported on 9/1/2019 3/98/01   Sinan Orlando MD       Allergies: Allergies   Allergen Reactions    Penicillins Anaphylaxis    Morphine Itching       Social History:     Marital Status: /Civil Union   Substance Use History:   Social History     Substance and Sexual Activity   Alcohol Use Never    Frequency: Never     Social History     Tobacco Use   Smoking Status Never Smoker   Smokeless Tobacco Current User    Types: Chew     Social History     Substance and Sexual Activity   Drug Use No       Family History:    Family History   Problem Relation Age of Onset    Cancer Mother     No Known Problems Father        Physical Exam:     Vitals:   Blood Pressure: 125/75 (10/17/19 1750)  Pulse: 64 (10/17/19 1750)  Temperature: 97 7 °F (36 5 °C) (10/17/19 1750)  Temp Source: Oral (10/17/19 1750)  Respirations: 18 (10/17/19 1750)  Height: 6' 9" (205 7 cm) (10/17/19 1309)  Weight - Scale: (!) 147 kg (325 lb) (10/17/19 1309)  SpO2: 95 % (10/17/19 1750)    Physical Exam   Constitutional: No distress  HENT:   Head: Normocephalic and atraumatic  Eyes: Pupils are equal, round, and reactive to light  Conjunctivae are normal    Neck: Normal range of motion  Neck supple  Cardiovascular: Normal rate, regular rhythm and normal heart sounds     Pulmonary/Chest: Effort normal  No respiratory distress  He has no wheezes  He has no rhonchi  He has no rales  He exhibits no tenderness  Abdominal: Soft  Bowel sounds are normal  He exhibits no distension  There is no tenderness  There is no rebound and no guarding  Musculoskeletal: He exhibits no edema  Neurological: He is alert  No cranial nerve deficit  Horizontal nystagmus noted  Motor strength is 5/5 in all extremities  Sensation normal to light touch in all extremities   Skin: Skin is warm and dry  No rash noted  Additional Data:     Lab Results: I have personally reviewed pertinent films in PACS    Results from last 7 days   Lab Units 10/17/19  1333   WBC Thousand/uL 4 87   HEMOGLOBIN g/dL 15 5   HEMATOCRIT % 46 9   PLATELETS Thousands/uL 216   NEUTROS PCT % 53     Results from last 7 days   Lab Units 10/17/19  1333   SODIUM mmol/L 142   POTASSIUM mmol/L 3 6   CHLORIDE mmol/L 106   CO2 mmol/L 28   BUN mg/dL 8   CREATININE mg/dL 1 14   CALCIUM mg/dL 8 9   TOTAL BILIRUBIN mg/dL 0 50   ALK PHOS U/L 65   ALT U/L 27   AST U/L 16     Results from last 7 days   Lab Units 10/17/19  1333   INR  0 98     Results from last 7 days   Lab Units 10/17/19  1333   TROPONIN I ng/mL <0 02               Imaging: I have personally reviewed pertinent films in PACS    CTA head and neck with and without contrast   Final Result by Sheng Chow MD (10/17 7154)      No mass effect, acute intracranial hemorrhage or CT evidence for large acute vascular distribution infarct  No high-grade stenosis, focal occlusion or vascular aneurysm of the intracranial circulation identified  No evidence for high-grade stenosis or focal occlusion of the cervical vasculature              Workstation performed: [id number]         MRI inpatient order    (Results Pending)   XR chest portable    (Results Pending)       CTA head and neck with and without contrast   Final Result      No mass effect, acute intracranial hemorrhage or CT evidence for large acute vascular distribution infarct  No high-grade stenosis, focal occlusion or vascular aneurysm of the intracranial circulation identified  No evidence for high-grade stenosis or focal occlusion of the cervical vasculature  Workstation performed: [id number]         MRI inpatient order    (Results Pending)   XR chest portable    (Results Pending)       EKG, Pathology, and Other Studies Reviewed on Admission:   EKG:  EKG shows normal sinus rhythm with PVCs out any acute ST-T changes  Allscripts / Epic Records Reviewed: Yes     ** Please Note: This note has been constructed using a voice recognition system   **

## 2019-10-17 NOTE — ED PROVIDER NOTES
History  Chief Complaint   Patient presents with    Dizziness     dizziness since last night, feels lightheaded or "like drunk", also complains of headache and nausea     26-year-old male with past medical history of atrial fibrillation status post cardioversion, GERD, hypertension, gout, vertigo, chronic back pain, presents to the ED with complaint of dizziness since last night  Patient states that he feels like he is drunk with the room spinning around him  Patient states that he was treated for vertigo by 3 years ago however this episode is worse  Patient is not anticoagulated since he has been normal sinus rhythm since his cardioversion  Patient states that intermittently he feels some palpitation however he is able to control them with vagal maneuvers  Patient has associated nausea with movement  Patient also states that he has had pain in his left ear for the past month  Patient denies any recent cold symptoms  History provided by:  Patient  Dizziness   Associated symptoms: nausea    Associated symptoms: no chest pain, no diarrhea, no headaches, no shortness of breath, no vomiting and no weakness        Prior to Admission Medications   Prescriptions Last Dose Informant Patient Reported? Taking?    HYDROcodone-acetaminophen (NORCO) 5-325 mg per tablet   No No   Sig: Take 1 tablet by mouth every 6 (six) hours as needed for pain for up to 15 dosesMax Daily Amount: 4 tablets   Pantoprazole Sodium (PROTONIX PO)  Self Yes No   Sig: Take 40 mg by mouth daily    carisoprodol (SOMA) 350 mg tablet  Self Yes No   Sig: Take 350 mg by mouth 2 (two) times a day as needed for muscle spasms    hydrochlorothiazide (HYDRODIURIL) 25 mg tablet   Yes No   Sig: Take 25 mg by mouth daily   indomethacin (INDOCIN) 50 mg capsule  Self Yes No   Sig: Take 50 mg by mouth 3 (three) times a day with meals   naproxen (NAPROSYN) 375 mg tablet   No No   Sig: Take 1 tablet (375 mg total) by mouth 2 (two) times a day with meals Patient not taking: Reported on 2019   oxyCODONE-acetaminophen (PERCOCET) 5-325 mg per tablet   No No   Si-2 po every 6 hours prn severe pain   Patient not taking: Reported on 2019      Facility-Administered Medications: None       Past Medical History:   Diagnosis Date    Atrial fibrillation (HCC)     Chronic pain     back pain    GERD (gastroesophageal reflux disease)     Gout     Hypertension        Past Surgical History:   Procedure Laterality Date    ANKLE SURGERY Right     APPENDECTOMY      CHOLECYSTECTOMY      ELBOW SURGERY      KNEE ARTHROPLASTY         History reviewed  No pertinent family history  I have reviewed and agree with the history as documented  Social History     Tobacco Use    Smoking status: Never Smoker    Smokeless tobacco: Current User     Types: Chew   Substance Use Topics    Alcohol use: Never     Frequency: Never    Drug use: No        Review of Systems   Constitutional: Negative for activity change, fatigue and fever  HENT: Positive for ear pain  Negative for congestion, ear discharge and sore throat  Eyes: Negative for pain and redness  Respiratory: Negative for cough, chest tightness, shortness of breath and wheezing  Cardiovascular: Negative for chest pain  Gastrointestinal: Positive for nausea  Negative for abdominal pain, diarrhea and vomiting  Endocrine: Negative for cold intolerance  Genitourinary: Negative for dysuria and urgency  Musculoskeletal: Negative for arthralgias and back pain  Neurological: Positive for dizziness  Negative for weakness and headaches  Psychiatric/Behavioral: Negative for agitation and behavioral problems  Physical Exam  Physical Exam   Constitutional: He is oriented to person, place, and time  He appears well-developed and well-nourished  HENT:   Head: Normocephalic and atraumatic     Nose: Nose normal    Mouth/Throat: Oropharynx is clear and moist    Eyes: Conjunctivae and EOM are normal  Neck: Normal range of motion  Neck supple  Cardiovascular: Normal rate, regular rhythm and normal heart sounds  Pulmonary/Chest: Effort normal and breath sounds normal    Abdominal: Soft  Bowel sounds are normal  He exhibits no distension  There is no tenderness  Musculoskeletal: Normal range of motion  Neurological: He is alert and oriented to person, place, and time  Mild horizontal nystagmus noted bilateral during initial on eye exam that was fatigable  Patient is alert and oriented x3  Patient notes blurry vision in the left upper quadrant of his visual field  Sensory and motor strength intact bilateral   No focal neuro deficits noted  Patient feels dizzy and nauseous during Sonido-Hallpike maneuver  However no horizontal or vertical nystagmus noted during Sonido-Hallpike maneuver  Skin: Skin is warm  Psychiatric: He has a normal mood and affect  His behavior is normal  Judgment and thought content normal    Nursing note and vitals reviewed        Vital Signs  ED Triage Vitals   Temperature Pulse Respirations Blood Pressure SpO2   10/17/19 1309 10/17/19 1309 10/17/19 1309 10/17/19 1309 10/17/19 1309   97 7 °F (36 5 °C) 63 18 129/71 95 %      Temp Source Heart Rate Source Patient Position - Orthostatic VS BP Location FiO2 (%)   10/17/19 1309 10/17/19 1334 10/17/19 1309 10/17/19 1309 --   Tympanic Monitor Sitting Right arm       Pain Score       10/17/19 1309       4           Vitals:    10/17/19 1515 10/17/19 1545 10/17/19 1600 10/17/19 1649   BP:    122/58   Pulse: 62 72 60 64   Patient Position - Orthostatic VS:             Visual Acuity      ED Medications  Medications   ondansetron (ZOFRAN) injection 4 mg (4 mg Intravenous Given 10/17/19 1340)   meclizine (ANTIVERT) tablet 50 mg (50 mg Oral Given 10/17/19 1341)   ondansetron (ZOFRAN) injection 4 mg (4 mg Intravenous Given 10/17/19 1344)   sodium chloride 0 9 % bolus 1,000 mL (0 mL Intravenous Stopped 10/17/19 1504)   iohexol (OMNIPAQUE) 350 MG/ML injection (MULTI-DOSE) 85 mL (85 mL Intravenous Given 10/17/19 1422)   diazepam (VALIUM) injection 10 mg (10 mg Intravenous Given 10/17/19 1609)   methylPREDNISolone sodium succinate (Solu-MEDROL) injection 60 mg (60 mg Intravenous Given 10/17/19 1609)       Diagnostic Studies  Results Reviewed     Procedure Component Value Units Date/Time    Rapid drug screen, urine [[id number]]  (Normal) Collected:  10/17/19 1402    Lab Status:  Final result Specimen:  Urine, Clean Catch Updated:  10/17/19 1422     Amph/Meth UR Negative     Barbiturate Ur Negative     Benzodiazepine Urine Negative     Cocaine Urine Negative     Methadone Urine Negative     Opiate Urine Negative     PCP Ur Negative     THC Urine Negative    Narrative:       FOR MEDICAL PURPOSES ONLY  IF CONFIRMATION NEEDED PLEASE CONTACT THE LAB WITHIN 5 DAYS      Drug Screen Cutoff Levels:  AMPHETAMINE/METHAMPHETAMINES  1000 ng/mL  BARBITURATES     200 ng/mL  BENZODIAZEPINES     200 ng/mL  COCAINE      300 ng/mL  METHADONE      300 ng/mL  OPIATES      300 ng/mL  PHENCYCLIDINE     25 ng/mL  THC       50 ng/mL      Protime-INR [[id number]]  (Normal) Collected:  10/17/19 1333    Lab Status:  Final result Specimen:  Blood from Arm, Right Updated:  10/17/19 1413     Protime 10 5 seconds      INR 0 98    APTT [[id number]]  (Normal) Collected:  10/17/19 1333    Lab Status:  Final result Specimen:  Blood from Arm, Right Updated:  10/17/19 1413     PTT 27 seconds     UA w Reflex to Microscopic w Reflex to Culture [[id number]] Collected:  10/17/19 1401    Lab Status:  Final result Specimen:  Urine, Clean Catch Updated:  10/17/19 1410     Color, UA Yellow     Clarity, UA Clear     Specific Gravity, UA 1 010     pH, UA 6 0     Leukocytes, UA Negative     Nitrite, UA Negative     Protein, UA Negative mg/dl      Glucose, UA Negative mg/dl      Ketones, UA Negative mg/dl      Urobilinogen, UA 0 2 E U /dl      Bilirubin, UA Negative     Blood, UA Negative    TSH, 3rd generation with Free T4 reflex [[id number]]  (Normal) Collected:  10/17/19 1333    Lab Status:  Final result Specimen:  Blood from Arm, Right Updated:  10/17/19 1408     TSH 3RD GENERATON 2 924 uIU/mL     Narrative:       Patients undergoing fluorescein dye angiography may retain small amounts of fluorescein in the body for 48-72 hours post procedure  Samples containing fluorescein can produce falsely depressed TSH values  If the patient had this procedure,a specimen should be resubmitted post fluorescein clearance        Magnesium [[id number]]  (Normal) Collected:  10/17/19 1333    Lab Status:  Final result Specimen:  Blood from Arm, Right Updated:  10/17/19 1408     Magnesium 2 2 mg/dL     Troponin I [[id number]]  (Normal) Collected:  10/17/19 1333    Lab Status:  Final result Specimen:  Blood from Arm, Right Updated:  10/17/19 1405     Troponin I <0 02 ng/mL     Comprehensive metabolic panel [[id number]] Collected:  10/17/19 1333    Lab Status:  Final result Specimen:  Blood from Arm, Right Updated:  10/17/19 1359     Sodium 142 mmol/L      Potassium 3 6 mmol/L      Chloride 106 mmol/L      CO2 28 mmol/L      ANION GAP 8 mmol/L      BUN 8 mg/dL      Creatinine 1 14 mg/dL      Glucose 100 mg/dL      Calcium 8 9 mg/dL      AST 16 U/L      ALT 27 U/L      Alkaline Phosphatase 65 U/L      Total Protein 7 1 g/dL      Albumin 3 6 g/dL      Total Bilirubin 0 50 mg/dL      eGFR 77 ml/min/1 73sq m     Narrative:       [facility] guidelines for Chronic Kidney Disease (CKD):     Stage 1 with normal or high GFR (GFR > 90 mL/min/1 73 square meters)    Stage 2 Mild CKD (GFR = 60-89 mL/min/1 73 square meters)    Stage 3A Moderate CKD (GFR = 45-59 mL/min/1 73 square meters)    Stage 3B Moderate CKD (GFR = 30-44 mL/min/1 73 square meters)    Stage 4 Severe CKD (GFR = 15-29 mL/min/1 73 square meters)    Stage 5 End Stage CKD (GFR <15 mL/min/1 73 square meters)  Note: GFR calculation is accurate only with a steady state creatinine    CBC and differential [[id number]] Collected:  10/17/19 1333    Lab Status:  Final result Specimen:  Blood from Arm, Right Updated:  10/17/19 1341     WBC 4 87 Thousand/uL      RBC 5 20 Million/uL      Hemoglobin 15 5 g/dL      Hematocrit 46 9 %      MCV 90 fL      MCH 29 8 pg      MCHC 33 0 g/dL      RDW 12 5 %      MPV 10 1 fL      Platelets 382 Thousands/uL      nRBC 0 /100 WBCs      Neutrophils Relative 53 %      Immat GRANS % 0 %      Lymphocytes Relative 36 %      Monocytes Relative 8 %      Eosinophils Relative 2 %      Basophils Relative 1 %      Neutrophils Absolute 2 60 Thousands/µL      Immature Grans Absolute 0 02 Thousand/uL      Lymphocytes Absolute 1 76 Thousands/µL      Monocytes Absolute 0 37 Thousand/µL      Eosinophils Absolute 0 09 Thousand/µL      Basophils Absolute 0 03 Thousands/µL                  CTA head and neck with and without contrast   Final Result by Angel Jacques MD (10/17 0319)      No mass effect, acute intracranial hemorrhage or CT evidence for large acute vascular distribution infarct  No high-grade stenosis, focal occlusion or vascular aneurysm of the intracranial circulation identified  No evidence for high-grade stenosis or focal occlusion of the cervical vasculature  Workstation performed: [id number]                    Procedures  ECG 12 Lead Documentation Only  Date/Time: 10/17/2019 1:09 PM  Performed by: Brittany Schilling DO  Authorized by: Brittany Schilling DO     Indications / Diagnosis:  Dizziness  ECG reviewed by me, the ED Provider: yes    Patient location:  ED  Previous ECG:     Previous ECG:  Compared to current    Similarity:  No change    Comparison to cardiac monitor: Yes    Interpretation:     Interpretation: normal    Comments:      Sinus rhythm, rate 61, normal axis, normal intervals, no acute ST/T-wave abnormalities noted, otherwise unremarkable EKG, unchanged from previous study             ED Course  ED Course as of Oct 17 1653   u Oct 17, 2019   1536 Patient re-examined at bedside  Patient continues to have dizziness and is not feeling better  1542 Case discussed with neurologist on call, Dr Kaela Mayfield, who reviewed lab and radiology finding  At this time she recommends trying Valium and steroids to see if dizziness improved  She recommends admission for MRI brain in the morning  [facility]  Number of Diagnoses or Management Options  Dizziness: new and requires workup  Diagnosis management comments: Obtain blood work, EKG, UA, orthostatic vital signs   Given patient's history of atrial fibrillation, will obtain CTA head/neck  Give IV fluids, antiemetics, meclizine and reassess symptoms  Amount and/or Complexity of Data Reviewed  Clinical lab tests: ordered and reviewed  Tests in the radiology section of CPT®: ordered and reviewed  Tests in the medicine section of CPT®: ordered and reviewed  Review and summarize past medical records: yes  Independent visualization of images, tracings, or specimens: yes    Risk of Complications, Morbidity, and/or Mortality  General comments: Lab and radiology results are unremarkable  Patient continued to dizziness after given meclizine  At this time case was discussed with neurologist who recommended admitting patient for MRI to rule out CVA as patient does have history of atrial fibrillation and he is not anticoagulated  IV Valium and Solu-Medrol also given for dizziness  Patient is admitted to tele office for further evaluation    Patient and family agrees with admission plans    Patient Progress  Patient progress: stable      Disposition  Final diagnoses:   Dizziness     Time reflects when diagnosis was documented in both MDM as applicable and the Disposition within this note     Time User Action Codes Description Comment    10/17/2019  4:34 PM Bhargavi Oro Add [R42] Dizziness       ED Disposition     ED Disposition Condition Date/Time Comment Admit Stable Thu Oct 17, 2019  4:34 PM Case was discussed with Dr Sukumar Mata and the patient's admission status was agreed to be Admission Status: observation status to the service of Dr Sukumar Mata  Follow-up Information    None         Patient's Medications   Discharge Prescriptions    No medications on file     No discharge procedures on file      ED Provider  Electronically Signed by           Nadine Petersen DO  10/17/19 8041

## 2019-10-17 NOTE — ASSESSMENT & PLAN NOTE
Most likely secondary to benign positional vertigo  Cannot rule out posterior circulation stroke given his history of atrial fibrillation  Patient received Solu-Medrol, meclizine and Valium in the ED  Continue meclizine 25 milligram p o  T i d   Will get PT/OT evaluation  Patient had CTA of the head and neck in the ED which was negative  Will get MRI of the brain  Will also check 2D echo with bubble study  Check orthostatic vital signs

## 2019-10-17 NOTE — ASSESSMENT & PLAN NOTE
Patient has history of right iliac artery dissection being followed by vascular surgery as outpatient

## 2019-10-17 NOTE — ASSESSMENT & PLAN NOTE
Hold hydrochlorothiazide as patient's blood pressure is borderline low  Check orthostatic vital signs

## 2019-10-17 NOTE — PLAN OF CARE
Problem: Potential for Falls  Goal: Patient will remain free of falls  Description  INTERVENTIONS:  - Assess patient frequently for physical needs  -  Identify cognitive and physical deficits and behaviors that affect risk of falls    -  [place] fall precautions as indicated by assessment   - Educate patient/family on patient safety including physical limitations  - Instruct patient to call for assistance with activity based on assessment  - Modify environment to reduce risk of injury  - Consider OT/PT consult to assist with strengthening/mobility  Outcome: Progressing

## 2019-10-17 NOTE — ASSESSMENT & PLAN NOTE
Patient has history of atrial fibrillation status post cardioversion  Patient does report intermittent palpitations which improve with Valsalva maneuvers  Patient currently in normal sinus rhythm

## 2019-10-17 NOTE — ASSESSMENT & PLAN NOTE
Patient reported exertional shortness of breath and leg swelling recently  Pulmonary exam showed clear lungs  Will get chest x-ray  Will also order 2D echo to assess EF and rule out valvular abnormalities

## 2019-10-17 NOTE — LETTER
[address] [facility] [address]  [place] [address]  Dept: [phone]    October 19, 2019     Patient: Lacey Valdez   YOB: 1975   Date of Visit: 10/17/2019       To Whom it May Concern:    Lacey Valdez is under my professional care  He was seen in the hospital from 10/17/2019   to 10/19/19  He may return to work on 10/21/19 without limitations  If you have any questions or concerns, please don't hesitate to call           Sincerely,          Marina Rodriguez MD

## 2019-10-18 ENCOUNTER — APPOINTMENT (OUTPATIENT)
Dept: RADIOLOGY | Facility: HOSPITAL | Age: 44
End: 2019-10-18
Payer: COMMERCIAL

## 2019-10-18 ENCOUNTER — APPOINTMENT (OUTPATIENT)
Dept: NON INVASIVE DIAGNOSTICS | Facility: HOSPITAL | Age: 44
End: 2019-10-18
Payer: COMMERCIAL

## 2019-10-18 LAB
ATRIAL RATE: 61 BPM
EST. AVERAGE GLUCOSE BLD GHB EST-MCNC: 103 MG/DL
HBA1C MFR BLD: 5.2 % (ref 4.2–6.3)
P AXIS: 6 DEGREES
PR INTERVAL: 134 MS
QRS AXIS: 9 DEGREES
QRSD INTERVAL: 92 MS
QT INTERVAL: 428 MS
QTC INTERVAL: 430 MS
T WAVE AXIS: 31 DEGREES
VENTRICULAR RATE: 61 BPM

## 2019-10-18 PROCEDURE — 93799 UNLISTED CV SVC/PROCEDURE: CPT

## 2019-10-18 PROCEDURE — 93010 ELECTROCARDIOGRAM REPORT: CPT | Performed by: INTERNAL MEDICINE

## 2019-10-18 PROCEDURE — 70551 MRI BRAIN STEM W/O DYE: CPT

## 2019-10-18 PROCEDURE — 99244 OFF/OP CNSLTJ NEW/EST MOD 40: CPT | Performed by: PSYCHIATRY & NEUROLOGY

## 2019-10-18 PROCEDURE — 99225 PR SBSQ OBSERVATION CARE/DAY 25 MINUTES: CPT | Performed by: NURSE PRACTITIONER

## 2019-10-18 PROCEDURE — 83036 HEMOGLOBIN GLYCOSYLATED A1C: CPT | Performed by: INTERNAL MEDICINE

## 2019-10-18 PROCEDURE — 93306 TTE W/DOPPLER COMPLETE: CPT | Performed by: INTERNAL MEDICINE

## 2019-10-18 PROCEDURE — 93306 TTE W/DOPPLER COMPLETE: CPT

## 2019-10-18 PROCEDURE — C8929 TTE W OR WO FOL WCON,DOPPLER: HCPCS

## 2019-10-18 RX ORDER — LORATADINE 10 MG/1
10 TABLET ORAL DAILY
Status: DISCONTINUED | OUTPATIENT
Start: 2019-10-18 | End: 2019-10-19 | Stop reason: HOSPADM

## 2019-10-18 RX ORDER — LORAZEPAM 2 MG/ML
1 INJECTION INTRAMUSCULAR ONCE
Status: COMPLETED | OUTPATIENT
Start: 2019-10-18 | End: 2019-10-18

## 2019-10-18 RX ORDER — SODIUM CHLORIDE 9 MG/ML
50 INJECTION, SOLUTION INTRAVENOUS CONTINUOUS
Status: DISCONTINUED | OUTPATIENT
Start: 2019-10-18 | End: 2019-10-19 | Stop reason: HOSPADM

## 2019-10-18 RX ORDER — PREDNISONE 20 MG/1
20 TABLET ORAL DAILY
Status: DISCONTINUED | OUTPATIENT
Start: 2019-10-22 | End: 2019-10-19 | Stop reason: HOSPADM

## 2019-10-18 RX ORDER — ACETAMINOPHEN 325 MG/1
650 TABLET ORAL EVERY 6 HOURS PRN
Status: DISCONTINUED | OUTPATIENT
Start: 2019-10-18 | End: 2019-10-19 | Stop reason: HOSPADM

## 2019-10-18 RX ORDER — CIPROFLOXACIN 500 MG/1
500 TABLET, FILM COATED ORAL EVERY 12 HOURS SCHEDULED
Status: DISCONTINUED | OUTPATIENT
Start: 2019-10-18 | End: 2019-10-19 | Stop reason: HOSPADM

## 2019-10-18 RX ORDER — PREDNISONE 20 MG/1
40 TABLET ORAL DAILY
Status: COMPLETED | OUTPATIENT
Start: 2019-10-18 | End: 2019-10-19

## 2019-10-18 RX ORDER — PREDNISONE 10 MG/1
10 TABLET ORAL DAILY
Status: DISCONTINUED | OUTPATIENT
Start: 2019-10-24 | End: 2019-10-19 | Stop reason: HOSPADM

## 2019-10-18 RX ADMIN — ACETAMINOPHEN 650 MG: 325 TABLET, FILM COATED ORAL at 23:03

## 2019-10-18 RX ADMIN — MECLIZINE HYDROCHLORIDE 25 MG: 25 TABLET ORAL at 15:33

## 2019-10-18 RX ADMIN — PREDNISONE 40 MG: 20 TABLET ORAL at 10:44

## 2019-10-18 RX ADMIN — MECLIZINE HYDROCHLORIDE 25 MG: 25 TABLET ORAL at 06:24

## 2019-10-18 RX ADMIN — ENOXAPARIN SODIUM 40 MG: 40 INJECTION SUBCUTANEOUS at 10:45

## 2019-10-18 RX ADMIN — MECLIZINE HYDROCHLORIDE 25 MG: 25 TABLET ORAL at 22:41

## 2019-10-18 RX ADMIN — SODIUM CHLORIDE 125 ML/HR: 0.9 INJECTION, SOLUTION INTRAVENOUS at 01:21

## 2019-10-18 RX ADMIN — LORAZEPAM 1 MG: 2 INJECTION INTRAMUSCULAR; INTRAVENOUS at 13:23

## 2019-10-18 RX ADMIN — SODIUM CHLORIDE 125 ML/HR: 0.9 INJECTION, SOLUTION INTRAVENOUS at 15:38

## 2019-10-18 RX ADMIN — PANTOPRAZOLE SODIUM 40 MG: 40 TABLET, DELAYED RELEASE ORAL at 10:44

## 2019-10-18 RX ADMIN — CIPROFLOXACIN HYDROCHLORIDE 500 MG: 500 TABLET, FILM COATED ORAL at 22:41

## 2019-10-18 RX ADMIN — PREDNISONE 40 MG: 20 TABLET ORAL at 18:59

## 2019-10-18 RX ADMIN — PERFLUTREN 1 ML/MIN: 6.52 INJECTION, SUSPENSION INTRAVENOUS at 11:16

## 2019-10-18 RX ADMIN — SODIUM CHLORIDE 50 ML/HR: 0.9 INJECTION, SOLUTION INTRAVENOUS at 18:59

## 2019-10-18 RX ADMIN — LORATADINE 10 MG: 10 TABLET ORAL at 19:00

## 2019-10-18 NOTE — ASSESSMENT & PLAN NOTE
Patient has history of atrial fibrillation status post cardioversion  Patient does report intermittent palpitations which improve with Valsalva maneuvers  Patient currently in normal sinus rhythm  No events on telemetry

## 2019-10-18 NOTE — DISCHARGE SUMMARY
Discharge- Boo Gordon 1975, 37 y o  male MRN: [id number]    Unit/Bed#: [address] [place] [address] Encounter: [id number]    Primary Care Provider: Gabo Lo MD   Date and time admitted to hospital: 10/17/2019  1:13 PM        * Dizziness  Assessment & Plan  Most likely secondary to benign positional vertigo vs labyrinthitis  Patient underwent MRI of the brain which was normal  Patient had CTA of the head and neck in the ED which was negative  Patient received Solu-Medrol, meclizine and Valium in the ED without improvement   Orthostatic vital signs negative  · Patient's hemoglobin A1c was normal and lipid panel was normal  · Since patient was complaining of left ear pain patient was started on ciprofloxacin will be discharged on Levaquin for 6 days to cover for infection  · Patient was started on prednisone  Continue meclizine for 2 more days      Headache  Assessment & Plan  Doubtful migraine as patient was not relieved with Toradol, Benadryl and regular  Likely withdrawal from nicotine and caffeine  Patient reported drinking 1 gallon of ice tea daily and patient also refused nicotine patch  CT scan and MRI of the brain were unremarkable    Atrial fibrillation [facility])  Assessment & Plan  Patient has history of atrial fibrillation status post cardioversion  Patient does report intermittent palpitations which improve with Valsalva maneuvers  Patient currently in normal sinus rhythm  No events on telemetry     Exertional dyspnea  Assessment & Plan  Patient reported exertional shortness of breath and leg swelling recently  Pulmonary exam showed clear lungs  Chest x-ray was normal  Serial troponins were negative  2D echo showed EF of 5% with mild concentric hypertrophy and grade 1 diastolic dysfunction without any significant valvular abnormality    Hypertension  Assessment & Plan  /73; orthostatic vital signs negative   Resume hydrochlorothiazide upon discharge    GERD (gastroesophageal reflux disease)  Assessment & Plan  Continue Protonix    Iliac artery dissection [facility])  Assessment & Plan  Patient has history of right iliac artery dissection being followed by vascular surgery as outpatient        Discharging Physician / Practitioner: Marina Rodriguez MD  PCP: Christophe Walter MD  Admission Date:   Admission Orders (From admission, onward)     Ordered        10/17/19 1635  Place in Observation  Once                   Discharge Date: 10/19/19    Resolved Problems  Date Reviewed: 10/19/2019    None          Consultations During Hospital Stay:  · Neurology     Procedures Performed:   · CXR:  NAD  · CTA H/N: No mass effect, acute intracranial hemorrhage or CT evidence for large acute vascular distribution infarct  No high-grade stenosis, focal occlusion or vascular aneurysm of the intracranial circulation identified  No evidence for high-grade stenosis or focal occlusion of the cervical vasculature  · MRI brain:  Normal  · ECHO:  Normal ejection fraction with grade 1 diastolic dysfunction without any PFO      Test Results Pending at Discharge (will require follow up): · None     Outpatient Tests Requested:  · None    Complications:  None    Reason for Admission: Dizziness     Hospital Course:     Lacey Valdez is a 37 y o  male patient with a PMH including gout, GERD, A Fib, HTN, and iliac artery dissection who originally presented to the hospital on 10/17/2019 due to dizziness associated with nausea  Patient was given Meclizine, Valium, and Solumedrol  CTA head and neck in ED was negative  Patient was admitted for further evaluation and treatment as posterior CVA could not be ruled out  Patient underwent an MRI of the brain and echocardiogram which were both unremarkable  Patient was also seen by Neurology  Patient has also been complaining of left ear pain and patient was started on quinolone to cover for infection  Later patient started complaining of severe headache and was given migraine cocktail    Patient also started on prednisone with improved dizziness  Patient remained is stable and will be discharged home to follow up outpatient with PCP  Please see above list of diagnoses and related plan for additional information  Condition at Discharge: stable     Discharge Day Visit / Exam:     Subjective:  Patient still complaining of throbbing frontal headache  Improved dizziness  Denies any chest pain or shortness of breath  Vitals: Blood Pressure: 108/56 (10/19/19 0716)  Pulse: 74 (10/19/19 0716)  Temperature: 97 8 °F (36 6 °C) (10/19/19 0716)  Temp Source: Tympanic (10/19/19 0716)  Respirations: 18 (10/19/19 0716)  Height: 6' 9" (205 7 cm) (10/17/19 1309)  Weight - Scale: (!) 147 kg (325 lb) (10/17/19 1309)  SpO2: 95 % (10/19/19 0716)  Exam:   Physical Exam   Constitutional: No distress  HENT:   Head: Normocephalic and atraumatic  Eyes: Pupils are equal, round, and reactive to light  Conjunctivae are normal    Neck: Normal range of motion  Neck supple  Cardiovascular: Normal rate, regular rhythm and normal heart sounds  Pulmonary/Chest: Effort normal  No respiratory distress  He has no wheezes  He has no rhonchi  He has no rales  He exhibits no tenderness  Abdominal: Soft  Bowel sounds are normal  He exhibits no distension  There is no tenderness  There is no rebound and no guarding  Musculoskeletal: He exhibits no edema  Neurological: He is alert  No cranial nerve deficit  Skin: Skin is warm and dry  No rash noted  Discussion with Family: Family at bedside    Discharge instructions/Information to patient and family:   See after visit summary for information provided to patient and family  Provisions for Follow-Up Care:  See after visit summary for information related to follow-up care and any pertinent home health orders        Disposition:     Home    For Discharges to [facility] SNF:   · Not Applicable to this Patient - Not Applicable to this Patient    Planned Readmission: None Discharge Statement:  I spent > 30 minutes discharging the patient  This time was spent on the day of discharge  I had direct contact with the patient on the day of discharge  Greater than 50% of the total time was spent examining patient, answering all patient questions, arranging and discussing plan of care with patient as well as directly providing post-discharge instructions  Additional time then spent on discharge activities  Discharge Medications:  See after visit summary for reconciled discharge medications provided to patient and family        ** Please Note: This note has been constructed using a voice recognition system **

## 2019-10-18 NOTE — PROGRESS NOTES
Progress Note - Desiree Trujillo 1975, 37 y o  male MRN: [id number]    Unit/Bed#: [address] [place] [address] Encounter: [id number]    Primary Care Provider: Kiel Iraheta MD   Date and time admitted to hospital: 10/17/2019  1:13 PM        * Dizziness  Assessment & Plan  Most likely secondary to benign positional vertigo vs sinus/ left ear infection    Cannot rule out posterior circulation stroke given his history of atrial fibrillation  Patient had CTA of the head and neck in the ED which was negative  Patient received Solu-Medrol, meclizine and Valium in the ED without improvement   Orthostatic vital signs negative  · Follow-up MRI brain and 2D ECHO  · Continue meclizine 25 mg TID, IV hydration  · Start Prednisone 40 mg daily with a 10 mg taper every 2nd day   · Start Cipro for 7 days for possible ear infection vs sinus infection   · Start Claritin (pt usually takes Zyrtec)   · Will get PT/OT evaluation    Atrial fibrillation [facility])  Assessment & Plan  Patient has history of atrial fibrillation status post cardioversion  Patient does report intermittent palpitations which improve with Valsalva maneuvers  Patient currently in normal sinus rhythm  No events on telemetry     Hypertension  Assessment & Plan  /73; orthostatic vital signs negative   Hold HCTZ as patient's blood pressure is borderline low    Exertional dyspnea  Assessment & Plan  Patient reported exertional shortness of breath and leg swelling recently  Pulmonary exam showed clear lungs  Will get chest x-ray  Will also order 2D echo to assess EF and rule out valvular abnormalities    Iliac artery dissection [facility])  Assessment & Plan  Patient has history of right iliac artery dissection being followed by vascular surgery as outpatient    GERD (gastroesophageal reflux disease)  Assessment & Plan  Continue Protonix      VTE Pharmacologic Prophylaxis:   Pharmacologic: Enoxaparin (Lovenox)  Mechanical VTE Prophylaxis in Place: Yes    Patient Centered Rounds: I have performed bedside rounds with nursing staff today  Discussions with Specialists or Other Care Team Provider: Nursing, CALOS, my attending, neurology     Education and Discussions with Family / Patient: I have answered all questions to the best of my ability  Family at bedside  Time Spent for Care: 20 minutes  More than 50% of total time spent on counseling and coordination of care as described above  Current Length of Stay: 0 day(s)    Current Patient Status: Observation   Certification Statement: The patient will continue to require additional inpatient hospital stay due to dizziness    Discharge Plan: Patient is not medically stable for discharge today due to persistent dizziness, likely discharge home tomorrow  Code Status: Level 1 - Full Code      Subjective:   Continues with dizziness  Report dull, left ear pain for 3 months  Now with sinus pressure and a headache  States he feels 'drunk' when he's ambulating as he is off balance  Denies CP or SOB  Objective:     Vitals:   Temp (24hrs), Av 1 °F (36 7 °C), Min:97 6 °F (36 4 °C), Max:99 1 °F (37 3 °C)    Temp:  [97 6 °F (36 4 °C)-99 1 °F (37 3 °C)] 97 9 °F (36 6 °C)  HR:  [63-82] 77  Resp:  [18] 18  BP: (109-130)/(58-87) 109/60  SpO2:  [94 %-97 %] 94 %  Body mass index is 34 83 kg/m²  Input and Output Summary (last 24 hours): Intake/Output Summary (Last 24 hours) at 10/18/2019 1659  Last data filed at 10/18/2019 0121  Gross per 24 hour   Intake 1450 ml   Output    Net 1450 ml       Physical Exam:     Physical Exam   Constitutional: He is oriented to person, place, and time  He appears well-developed  No distress  Pleasant, ill appearing gentleman resting in bed, mildly diaphoretic    HENT:   Head: Normocephalic  Neck: Normal range of motion  Cardiovascular: Normal rate and regular rhythm  Pulmonary/Chest: Effort normal and breath sounds normal  No respiratory distress  He has no wheezes  He has no rhonchi  He has no rales  Abdominal: Soft  Bowel sounds are normal  He exhibits no distension  There is no tenderness  Musculoskeletal: Normal range of motion  He exhibits no edema or tenderness  Off balance with ambulation    Neurological: He is alert and oriented to person, place, and time  He has normal reflexes  Skin: Skin is warm and dry  No rash noted  He is not diaphoretic  Psychiatric: He has a normal mood and affect  Judgment normal    Nursing note and vitals reviewed  Additional Data:     Labs:    Results from last 7 days   Lab Units 10/17/19  1333   WBC Thousand/uL 4 87   HEMOGLOBIN g/dL 15 5   HEMATOCRIT % 46 9   PLATELETS Thousands/uL 216   NEUTROS PCT % 53   LYMPHS PCT % 36   MONOS PCT % 8   EOS PCT % 2     Results from last 7 days   Lab Units 10/17/19  1333   POTASSIUM mmol/L 3 6   CHLORIDE mmol/L 106   CO2 mmol/L 28   BUN mg/dL 8   CREATININE mg/dL 1 14   CALCIUM mg/dL 8 9   ALK PHOS U/L 65   ALT U/L 27   AST U/L 16     Results from last 7 days   Lab Units 10/17/19  1333   INR  0 98       * I Have Reviewed All Lab Data Listed Above  * Additional Pertinent Lab Tests Reviewed:  All Labs Within Last 24 Hours Reviewed    Imaging:    Imaging Reports Reviewed Today Include: CTA H/A  Imaging Personally Reviewed by Myself Includes:  None    Recent Cultures (last 7 days):           Last 24 Hours Medication List:     Current Facility-Administered Medications:  ciprofloxacin 500 mg Oral Q12H [facility] SHAKA Harris   enoxaparin 40 mg Subcutaneous Daily Aris Machuca MD   loratadine 10 mg Oral Daily SHAKA Padilla   meclizine 25 mg Oral Q8H [facility] Gomez Oscar MD   melatonin 3 mg Oral HS PRN SHAKA Webster   nicotine 1 patch Transdermal Daily Aris Machuca MD   ondansetron 4 mg Intravenous Q6H PRN Aris Machuca MD   pantoprazole 40 mg Oral Daily Aris Machuca MD   predniSONE 40 mg Oral Daily SHAKA Harris   Followed by       Patrice Molina ON 10/20/2019] predniSONE 30 mg Oral Daily SHAKA Jarrell   Followed by       Haydee Lam ON 10/22/2019] predniSONE 20 mg Oral Daily SHAKA Jarrell   Followed by       Haydee Lam ON 10/24/2019] predniSONE 10 mg Oral Daily SHAKA Jarrell   sodium chloride 50 mL/hr Intravenous Continuous SHAKA Jarrell        Today, Patient Was Seen By: SHAKA Jarrell    ** Please Note: Dictation voice to text software may have been used in the creation of this document   **

## 2019-10-18 NOTE — UTILIZATION REVIEW
Initial Clinical Review    Admission: Date/Time/Statement:    Admission Orders (From admission, onward)     Ordered        10/17/19 1635  Place in Observation  Once                   Orders Placed This Encounter   Procedures    Place in Observation     Standing Status:   Standing     Number of Occurrences:   1     Order Specific Question:   Admitting Physician     Answer:   Jorge Luis Gibson     Order Specific Question:   Level of Care     Answer:   Med Surg [16]     ED Arrival Information     Expected Arrival Acuity Means of Arrival Escorted By Service Admission Type    - 10/17/2019 13:02 Urgent Walk-In Spouse General Medicine Urgent    Arrival Complaint    dizzy        Chief Complaint   Patient presents with    Dizziness     dizziness since last night, feels lightheaded or "like drunk", also complains of headache and nausea     Assessment/Plan:   64-year-old male with past medical history of atrial fibrillation status post cardioversion, GERD, hypertension, gout, vertigo, chronic back pain, presents to the ED with complaint of dizziness since last night  Patient states that he feels like he is drunk with the room spinning around him  Patient states that he was treated for vertigo by 3 years ago however this episode is worse  Patient is not anticoagulated since he has been normal sinus rhythm since his cardioversion  Patient states that intermittently he feels some palpitation however he is able to control them with vagal maneuvers  Patient has associated nausea with movement  Patient also states that he has had pain in his left ear for the past month  Patient denies any recent cold symptoms    Dizziness  Assessment & Plan  Most likely secondary to benign positional vertigo  Cannot rule out posterior circulation stroke given his history of atrial fibrillation  Patient received Solu-Medrol, meclizine and Valium in the ED  Continue meclizine 25 milligram p o  T i d   Will get PT/OT evaluation  Patient had CTA of the head and neck in the ED which was negative  Will get MRI of the brain  Will also check 2D echo with bubble study  Check orthostatic vital signs     Atrial fibrillation [facility])  Assessment & Plan  Patient has history of atrial fibrillation status post cardioversion  Patient does report intermittent palpitations which improve with Valsalva maneuvers  Patient currently in normal sinus rhythm      Exertional dyspnea  Assessment & Plan  Patient reported exertional shortness of breath and leg swelling recently  Pulmonary exam showed clear lungs  Will get chest x-ray  Will also order 2D echo to assess EF and rule out valvular abnormalities     Hypertension  Assessment & Plan  Hold hydrochlorothiazide as patient's blood pressure is borderline low  Check orthostatic vital signs    ED Triage Vitals   Temperature Pulse Respirations Blood Pressure SpO2   10/17/19 1309 10/17/19 1309 10/17/19 1309 10/17/19 1309 10/17/19 1309   97 7 °F (36 5 °C) 63 18 129/71 95 %      Temp Source Heart Rate Source Patient Position - Orthostatic VS BP Location FiO2 (%)   10/17/19 1309 10/17/19 1334 10/17/19 1309 10/17/19 1309 --   Tympanic Monitor Sitting Right arm       Pain Score       10/17/19 1309       4        Wt Readings from Last 1 Encounters:   10/17/19 (!) 147 kg (325 lb)     Additional Vital Signs:   Date/Time  Temp  Pulse  Resp  BP  SpO2  O2 Device  Patient Position - Orthostatic VS   10/18/19 0020  98 °F (36 7 °C)  69  18  118/58  95 %  None (Room air)  Lying   10/17/19 2015        126/87      Standing - Orthostatic VS   10/17/19 2014    82    130/65      Sitting - Orthostatic VS   10/17/19 2013    77    127/62      Lying - Orthostatic VS   Pertinent Labs/Diagnostic Test Results:   Results from last 7 days   Lab Units 10/17/19  1333   WBC Thousand/uL 4 87   HEMOGLOBIN g/dL 15 5   HEMATOCRIT % 46 9   PLATELETS Thousands/uL 216   NEUTROS ABS Thousands/µL 2 60     Results from last 7 days   Lab Units 10/17/19  1333   SODIUM mmol/L 142   POTASSIUM mmol/L 3 6   CHLORIDE mmol/L 106   CO2 mmol/L 28   ANION GAP mmol/L 8   BUN mg/dL 8   CREATININE mg/dL 1 14   EGFR ml/min/1 73sq m 77   CALCIUM mg/dL 8 9   MAGNESIUM mg/dL 2 2     Results from last 7 days   Lab Units 10/17/19  1333   AST U/L 16   ALT U/L 27   ALK PHOS U/L 65   TOTAL PROTEIN g/dL 7 1   ALBUMIN g/dL 3 6   TOTAL BILIRUBIN mg/dL 0 50     Results from last 7 days   Lab Units 10/17/19  1333   GLUCOSE RANDOM mg/dL 100     Results from last 7 days   Lab Units 10/17/19  2250 10/17/19  2006 10/17/19  1333   TROPONIN I ng/mL <0 02 <0 02 <0 02     Results from last 7 days   Lab Units 10/17/19  1333   PROTIME seconds 10 5   INR  0 98   PTT seconds 27     Results from last 7 days   Lab Units 10/17/19  1333   TSH 3RD GENERATON uIU/mL 2 924     Results from last 7 days   Lab Units 10/17/19  1401   CLARITY UA  Clear   COLOR UA  Yellow   SPEC GRAV UA  1 010   PH UA  6 0   GLUCOSE UA mg/dl Negative   KETONES UA mg/dl Negative   BLOOD UA  Negative   PROTEIN UA mg/dl Negative   NITRITE UA  Negative   BILIRUBIN UA  Negative   UROBILINOGEN UA E U /dl 0 2   LEUKOCYTES UA  Negative     Results from last 7 days   Lab Units 10/17/19  1402   AMPH/METH  Negative   BARBITURATE UR  Negative   BENZODIAZEPINE UR  Negative   COCAINE UR  Negative   METHADONE URINE  Negative   OPIATE UR  Negative   PCP UR  Negative   THC UR  Negative     CTA HEAD & NECK=No mass effect, acute intracranial hemorrhage or CT evidence for large acute vascular distribution infarct  No high-grade stenosis, focal occlusion or vascular aneurysm of the intracranial circulation identified  No evidence for high-grade stenosis or focal occlusion of the cervical vasculature  EKG=Sinus rhythm, rate 61, normal axis, normal intervals, no acute ST/T-wave abnormalities noted, otherwise unremarkable EKG, unchanged from previous study    ED Treatment:   Medication Administration from 10/17/2019 1302 to 10/17/2019 1460 Date/Time Order Dose Route     10/17/2019 1340 ondansetron (ZOFRAN) injection 4 mg 4 mg Intravenous     10/17/2019 1341 meclizine (ANTIVERT) tablet 50 mg 50 mg Oral     10/17/2019 1344 ondansetron (ZOFRAN) injection 4 mg 4 mg Intravenous     10/17/2019 1404 sodium chloride 0 9 % bolus 1,000 mL 1,000 mL Intravenous     10/17/2019 1422 iohexol (OMNIPAQUE) 350 MG/ML injection (MULTI-DOSE) 85 mL 85 mL Intravenous     10/17/2019 1609 diazepam (VALIUM) injection 10 mg 10 mg Intravenous     10/17/2019 1609 methylPREDNISolone sodium succinate (Solu-MEDROL) injection 60 mg 60 mg Intravenous        Past Medical History:   Diagnosis Date    Atrial fibrillation (HCC)     Chronic pain     back pain    GERD (gastroesophageal reflux disease)     Gout     Hypertension     Iliac artery dissection (HCC)    Admitting Diagnosis: Dizziness [R42]  Age/Sex: 37 y o  male  Admission Orders:  TELEMETRY  ORTHOSTATIC BP  CONSULT NEURO  Medications:  enoxaparin 40 mg Subcutaneous Daily   meclizine 25 mg Oral Q8H [address]   nicotine 1 patch Transdermal Daily   pantoprazole 40 mg Oral Daily   predniSONE 40 mg Oral Daily     sodium chloride 125 mL/hr Intravenous Continuous     melatonin 3 mg Oral HS PRN   ondansetron 4 mg Intravenous Q6H PRN     Network Utilization Review Department  Phone: [phone]; Fax [phone]  [email or web address]  ATTENTION: Please call with any questions or concerns to [phone]  and carefully listen to the prompts so that you are directed to the right person  Send all requests for admission clinical reviews, approved or denied determinations and any other requests to fax [phone]   All voicemails are confidential

## 2019-10-18 NOTE — CONSULTS
[facility]   Neurology Initial Consult    Fernando Kraft is a 37 y o  male  Sophy 34-*          Information obtained from:   Chief Complaint   Patient presents with    Dizziness     dizziness since last night, feels lightheaded or "like drunk", also complains of headache and nausea         Assessment/Plan:    1  BPV vs labyrynthitis   2  Possible left middle ear infection   3  A fib  4  HTN  5  H/o iliac artery dissection    Patient carries multiple stroke risk factors  His left sided seemed slightly weak but he says he's right dominant so has been that way  We had ordered MRI brain to r/o posterior circulation stroke  It's negative per my review but official read is pending  For vertigo, cont prednisone taper as follows: 40mg for 2d, 30mg for 2d, 20mg for 2d, 10mg for 1d  There is possible inner ear infection as he complains of pain  Consider one week course of Augmentin  Resume meclizine 25mg tid for next 2-3 days  IV hydration  A fib management per cardiology   TTE w/ shunt- no clear source of emboli   PT/OT  referral to [facility] center for vestibular rehab          HPI:  Fernando Kraft is a 36 yo M with PMH of A fib s/p ablation, HTN presents with cc of dizziness  Patient reports dizziness since Wed night  He feels as though he's drunk and everything around him is spinning  He reports associated nausea  Denies headache during encounter  He has had episodes of vertigo 3 years ago and was treated for it but this one is much worse  He denies associated tinnitus, hearing loss  He does report left ear pain for past month   Denies associated focal weakness, speech, swallowing, vision disturbance         Past Medical History:   Diagnosis Date    Atrial fibrillation (HCC)     Chronic pain     back pain    GERD (gastroesophageal reflux disease)     Gout     Hypertension     Iliac artery dissection [facility])        Past Surgical History:   Procedure Laterality Date    ANKLE SURGERY Right     APPENDECTOMY      CHOLECYSTECTOMY      ELBOW SURGERY      KNEE ARTHROPLASTY         Allergies   Allergen Reactions    Penicillins Anaphylaxis    Morphine Itching         Current Facility-Administered Medications:     enoxaparin (LOVENOX) subcutaneous injection 40 mg, 40 mg, Subcutaneous, Daily, Pierce Ching MD, 40 mg at 10/18/19 1045    meclizine (ANTIVERT) tablet 25 mg, 25 mg, Oral, Q8H [address], Tito Marti MD, 25 mg at 10/18/19 1533    melatonin tablet 3 mg, 3 mg, Oral, HS PRN, SHAKA Simon, 3 mg at 10/17/19 2335    nicotine (NICODERM CQ) 14 mg/24hr TD 24 hr patch 1 patch, 1 patch, Transdermal, Daily, Priyanka Lovelace MD    ondansetron (ZOFRAN) injection 4 mg, 4 mg, Intravenous, Q6H PRN, Merla Ellie Ching MD    pantoprazole (PROTONIX) EC tablet 40 mg, 40 mg, Oral, Daily, Pierce Ching MD, 40 mg at 10/18/19 1044    sodium chloride 0 9 % infusion, 50 mL/hr, Intravenous, Continuous, SHAKA Davidson    Social History     Socioeconomic History    Marital status: /Civil Union     Spouse name: Not on file    Number of children: Not on file    Years of education: Not on file    Highest education level: Not on file   Occupational History    Not on file   Social Needs    Financial resource strain: Not on file    Food insecurity:     Worry: Not on file     Inability: Not on file    Transportation needs:     Medical: Not on file     Non-medical: Not on file   Tobacco Use    Smoking status: Never Smoker    Smokeless tobacco: Current User     Types: Chew   Substance and Sexual Activity    Alcohol use: Never     Frequency: Never    Drug use: No    Sexual activity: Not on file   Lifestyle    Physical activity:     Days per week: Not on file     Minutes per session: Not on file    Stress: Not on file   Relationships    Social connections:     Talks on phone: Not on file     Gets together: Not on file     Attends Mandaeism service: Not on file     Active member of club or organization: Not on file     Attends meetings of clubs or organizations: Not on file     Relationship status: Not on file    Intimate partner violence:     Fear of current or ex partner: Not on file     Emotionally abused: Not on file     Physically abused: Not on file     Forced sexual activity: Not on file   Other Topics Concern    Not on file   Social History Narrative    Not on file       Family History   Problem Relation Age of Onset    Cancer Mother     No Known Problems Father          Review of systems:  Please see HPI for positive symptoms  No fever, no chills, no weight change  + dizzy Ocular: No drainage, no blurred vision  HEENT:  No sore throat, earache, or congestion  No neck pain  COR:  No chest pain  No palpitations  Lungs:  no sob, wheezing,  GI:  no  nausea, no vomiting, no diarrhea, no constipation, no anorexia  :  No dysuria, frequency, or urgency  No hematuria  Musculoskeletal:  No joint pain or swelling or edema  Skin:  No rash or itching  Psychiatric:  no anxiety, no depression  Endocrine:  No polyuria or polydipsia  Physical examination:  Vitals:    10/18/19 1601   BP:    Pulse:    Resp:    Temp: 97 9 °F (36 6 °C)   SpO2:        GENERAL APPEARANCE:  The patient is alert, oriented  He is in no acute distress  HEENT:  Head is normocephalic  The sinuses are otherwise nontender  Pupils are equal and reactive  NECK:  Supple without lymphadenopathy  HEART:  Regular rate and rhythm  LUNGS:  clear to auscultation  No crackles or wheezes are heard  ABDOMEN:  Soft, nontender, nondistended with good bowel sounds heard  EXTREMITIES:  Without cyanosis, clubbing or edema  Mental status: The patient is alert, attentive, and oriented  Speech is clear and fluent, good repetition, comprehension, and naming  he recalls 3/3 objects at 5 minutes  Cranial nerves:  CN II: Visual fields are full to confrontation   Fundoscopic exam is normal with sharp discs and no vascular changes    Pupils are 3 mm and reactive to light  CN III, IV, VI: At primary gaze, there is no eye deviation  CN V: Facial sensation is intact to pinprick in all 3 divisions bilaterally  Corneal responses are intact  CN VII: Face is symmetric with normal eye closure and smile  CN VIII: Hearing is normal to rubbing fingers  CN IX, X: Palate elevates symmetrically  Phonation is normal   CN XI: Head turning and shoulder shrug are intact  CN XII: Tongue is midline with normal movements and no atrophy  Motor: There is no pronator drift of out-stretched arms  Muscle bulk and tone are normal      Muscle exam  Arm Right Left Leg Right Left   Deltoid 5/5 4+/5 Iliopsoas 5/5 5/5   Biceps 5/5 5/5 Quads 5/5 4+/5   Triceps 5/5 5/5 Hamstrings 5/5 4+/5   Wrist Extension 5/5 5/5 Ankle Dorsi Flexion 5/5 5/5   Wrist Flexion 5/5 5/5 Ankle Plantar Flexion 5/5 5/5   Interossei 5/5 5/5 Ankle Eversion 5/5 5/5   APB 5/5 5/5 Ankle Inversion 5/5 5/5       Reflexes   RJ BJ TJ KJ AJ Plantars Whaely's   Right 2+ 2+ 2+ 2+ 2+ Downgoing Not present   Left 2+ 2+ 2+ 2+ 2+ Downgoing Not present     Sensory:  Light touch, pinprick, position sense, and vibration sense are intact in fingers and toes  Coordination:  Rapid alternating movements and fine finger movements are intact  There is no dysmetria on finger-to-nose and heel-knee-shin  There are no abnormal or extraneous movements  Romberg positive    Gait/Stance:  Very unsteady without assistance     Lab Results   Component Value Date    WBC 4 87 10/17/2019    HGB 15 5 10/17/2019    HCT 46 9 10/17/2019    MCV 90 10/17/2019     10/17/2019     Lab Results   Component Value Date    HGBA1C 5 2 10/18/2019     Lab Results   Component Value Date    ALT 27 10/17/2019    AST 16 10/17/2019    ALKPHOS 65 10/17/2019    BILITOT 0 3 04/19/2014     Lab Results   Component Value Date    GLUCOSE 90 04/19/2014    CALCIUM 8 9 10/17/2019     04/19/2014    K 3 6 10/17/2019    CO2 28 10/17/2019     10/17/2019    BUN 8 10/17/2019    CREATININE 1 14 10/17/2019         Radiology          Review of reports and Independent Interpretation of images or specimens:  Cta Head And Neck With And Without Contrast    Result Date: 10/17/2019  No mass effect, acute intracranial hemorrhage or CT evidence for large acute vascular distribution infarct  No high-grade stenosis, focal occlusion or vascular aneurysm of the intracranial circulation identified  No evidence for high-grade stenosis or focal occlusion of the cervical vasculature  Mri brain:   No acute process noted upon personal review           Thank you for this consult  Total time of encounter: 70 min  More than 50% of time was spent in counseling and coordination of care of patient  DONTA Mejia    [facility]  [address]  [place] [address]

## 2019-10-18 NOTE — ASSESSMENT & PLAN NOTE
Most likely secondary to benign positional vertigo vs sinus/ left ear infection    Cannot rule out posterior circulation stroke given his history of atrial fibrillation  Patient had CTA of the head and neck in the ED which was negative  Patient received Solu-Medrol, meclizine and Valium in the ED without improvement   Orthostatic vital signs negative  · Follow-up MRI brain and 2D ECHO  · Continue meclizine 25 mg TID, IV hydration  · Start Prednisone 40 mg daily with a 10 mg taper every 2nd day   · Start Cipro for 7 days for possible ear infection vs sinus infection   · Start Claritin (pt usually takes Zyrtec)   · Will get PT/OT evaluation

## 2019-10-19 VITALS
OXYGEN SATURATION: 95 % | HEART RATE: 74 BPM | TEMPERATURE: 97.8 F | RESPIRATION RATE: 18 BRPM | WEIGHT: 315 LBS | SYSTOLIC BLOOD PRESSURE: 108 MMHG | DIASTOLIC BLOOD PRESSURE: 56 MMHG | BODY MASS INDEX: 36.45 KG/M2 | HEIGHT: 78 IN

## 2019-10-19 PROBLEM — R51.9 HEADACHE: Status: ACTIVE | Noted: 2019-10-19

## 2019-10-19 RX ORDER — KETOROLAC TROMETHAMINE 30 MG/ML
30 INJECTION, SOLUTION INTRAMUSCULAR; INTRAVENOUS ONCE
Status: COMPLETED | OUTPATIENT
Start: 2019-10-19 | End: 2019-10-19

## 2019-10-19 RX ORDER — MECLIZINE HYDROCHLORIDE 25 MG/1
25 TABLET ORAL EVERY 8 HOURS SCHEDULED
Qty: 10 TABLET | Refills: 0 | Status: SHIPPED | OUTPATIENT
Start: 2019-10-19

## 2019-10-19 RX ORDER — KETOROLAC TROMETHAMINE 30 MG/ML
15 INJECTION, SOLUTION INTRAMUSCULAR; INTRAVENOUS ONCE
Status: COMPLETED | OUTPATIENT
Start: 2019-10-19 | End: 2019-10-19

## 2019-10-19 RX ORDER — ACETAMINOPHEN 325 MG/1
650 TABLET ORAL ONCE
Status: COMPLETED | OUTPATIENT
Start: 2019-10-19 | End: 2019-10-19

## 2019-10-19 RX ORDER — NICOTINE 21 MG/24HR
1 PATCH, TRANSDERMAL 24 HOURS TRANSDERMAL DAILY
Qty: 28 PATCH | Refills: 0 | Status: SHIPPED | OUTPATIENT
Start: 2019-10-20

## 2019-10-19 RX ORDER — LEVOFLOXACIN 750 MG/1
750 TABLET ORAL EVERY 24 HOURS
Qty: 6 TABLET | Refills: 0 | Status: SHIPPED | OUTPATIENT
Start: 2019-10-19 | End: 2019-10-25

## 2019-10-19 RX ORDER — DIPHENHYDRAMINE HYDROCHLORIDE 50 MG/ML
25 INJECTION INTRAMUSCULAR; INTRAVENOUS ONCE
Status: COMPLETED | OUTPATIENT
Start: 2019-10-19 | End: 2019-10-19

## 2019-10-19 RX ORDER — PREDNISONE 10 MG/1
10 TABLET ORAL DAILY
Qty: 12 TABLET | Refills: 0 | Status: SHIPPED | OUTPATIENT
Start: 2019-10-20 | End: 2019-10-26

## 2019-10-19 RX ORDER — METOCLOPRAMIDE HYDROCHLORIDE 5 MG/ML
10 INJECTION INTRAMUSCULAR; INTRAVENOUS ONCE
Status: COMPLETED | OUTPATIENT
Start: 2019-10-19 | End: 2019-10-19

## 2019-10-19 RX ADMIN — KETOROLAC TROMETHAMINE 15 MG: 30 INJECTION INTRAMUSCULAR; INTRAVENOUS at 05:24

## 2019-10-19 RX ADMIN — PANTOPRAZOLE SODIUM 40 MG: 40 TABLET, DELAYED RELEASE ORAL at 08:02

## 2019-10-19 RX ADMIN — MECLIZINE HYDROCHLORIDE 25 MG: 25 TABLET ORAL at 05:24

## 2019-10-19 RX ADMIN — SODIUM CHLORIDE 50 ML/HR: 0.9 INJECTION, SOLUTION INTRAVENOUS at 05:28

## 2019-10-19 RX ADMIN — METOCLOPRAMIDE 10 MG: 5 INJECTION, SOLUTION INTRAMUSCULAR; INTRAVENOUS at 07:22

## 2019-10-19 RX ADMIN — CIPROFLOXACIN HYDROCHLORIDE 500 MG: 500 TABLET, FILM COATED ORAL at 08:02

## 2019-10-19 RX ADMIN — KETOROLAC TROMETHAMINE 30 MG: 30 INJECTION INTRAMUSCULAR; INTRAVENOUS at 11:45

## 2019-10-19 RX ADMIN — LORATADINE 10 MG: 10 TABLET ORAL at 08:02

## 2019-10-19 RX ADMIN — PREDNISONE 40 MG: 20 TABLET ORAL at 08:02

## 2019-10-19 RX ADMIN — DIPHENHYDRAMINE HYDROCHLORIDE 25 MG: 50 INJECTION, SOLUTION INTRAMUSCULAR; INTRAVENOUS at 07:05

## 2019-10-19 RX ADMIN — ENOXAPARIN SODIUM 40 MG: 40 INJECTION SUBCUTANEOUS at 08:02

## 2019-10-19 RX ADMIN — ACETAMINOPHEN 650 MG: 325 TABLET, FILM COATED ORAL at 03:35

## 2019-10-19 RX ADMIN — ONDANSETRON 4 MG: 2 INJECTION INTRAMUSCULAR; INTRAVENOUS at 05:32

## 2019-10-19 NOTE — NURSING NOTE
Pt left via walking with a steady gait accompanied by his wife  IV dc and intact  Discharge instructions reviewed with pt and his wife  Prescriptions called into pt's preferred pharmacy  Pt refused vaccinations  Pt refused smoking cessation education  No further questions at this time

## 2019-10-19 NOTE — ASSESSMENT & PLAN NOTE
Most likely secondary to benign positional vertigo vs labyrinthitis  Patient underwent MRI of the brain which was normal  Patient had CTA of the head and neck in the ED which was negative  Patient received Solu-Medrol, meclizine and Valium in the ED without improvement   Orthostatic vital signs negative  · Patient's hemoglobin A1c was normal and lipid panel was normal  · Since patient was complaining of left ear pain patient was started on ciprofloxacin will be discharged on Levaquin for 6 days to cover for infection  · Patient was started on prednisone  Continue meclizine for 2 more days

## 2019-10-19 NOTE — UTILIZATION REVIEW
Continued Stay Review    Date: 10/19/19 Saturday                          Current Patient Class:  OBSERVATION    Current Level of Care: MED SURG    HPI:   37year old male initially placed in [facility] 10/17/19 at 1635 2nd Dizziness "feels drunk with the room spinning around"    CURRENT ASSESSMENT/ PLAN PER RECENT INT MED NOTE:  * Dizziness  Assessment & Plan  Most likely secondary to benign positional vertigo vs sinus/ left ear infection    Cannot rule out posterior circulation stroke given his history of atrial fibrillation  Patient had CTA of the head and neck in the ED which was negative  Patient received Solu-Medrol, meclizine and Valium in the ED without improvement   Orthostatic vital signs negative  · Follow-up MRI brain and 2D ECHO  · Continue meclizine 25 mg TID, IV hydration  · Start Prednisone 40 mg daily with a 10 mg taper every 2nd day   · Start Cipro for 7 days for possible ear infection vs sinus infection   · Start Claritin (pt usually takes Zyrtec)   · Will get PT/OT evaluation     Atrial fibrillation [facility])  Assessment & Plan  Patient has history of atrial fibrillation status post cardioversion  Patient does report intermittent palpitations which improve with Valsalva maneuvers  Patient currently in normal sinus rhythm  No events on telemetry      Exertional dyspnea  Assessment & Plan  Patient reported exertional shortness of breath and leg swelling recently  Pulmonary exam showed clear lungs  Will get chest x-ray  Will also order 2D echo to assess EF and rule out valvular abnormalities     Iliac artery dissection [facility])  Assessment & Plan  Patient has history of right iliac artery dissection being followed by vascular surgery as outpatient     VTE Pharmacologic Prophylaxis:   Pharmacologic: Enoxaparin (Lovenox)  Mechanical VTE Prophylaxis in Place:  Yes      Current Patient Status: Observation   Certification Statement: The patient will continue to require additional hospital stay     Pertinent Labs/Diagnostic Results:   Results from last 7 days   Lab Units 10/17/19  1333   WBC Thousand/uL 4 87   HEMOGLOBIN g/dL 15 5   HEMATOCRIT % 46 9   PLATELETS Thousands/uL 216   NEUTROS ABS Thousands/µL 2 60     Results from last 7 days   Lab Units 10/17/19  1333   SODIUM mmol/L 142   POTASSIUM mmol/L 3 6   CHLORIDE mmol/L 106   CO2 mmol/L 28   ANION GAP mmol/L 8   BUN mg/dL 8   CREATININE mg/dL 1 14   EGFR ml/min/1 73sq m 77   CALCIUM mg/dL 8 9   MAGNESIUM mg/dL 2 2     Results from last 7 days   Lab Units 10/17/19  1333   GLUCOSE RANDOM mg/dL 100     Results from last 7 days   Lab Units 10/17/19  2250 10/17/19  2006 10/17/19  1333   TROPONIN I ng/mL <0 02 <0 02 <0 02     Results from last 7 days   Lab Units 10/17/19  1333   PROTIME seconds 10 5   INR  0 98   PTT seconds 27       Vital Signs:  Temp (24hrs), Av 1 °F (36 7 °C), Min:97 6 °F (36 4 °C), Max:99 1 °F (37 3 °C)  Temp:  [97 6 °F (36 4 °C)-99 1 °F (37 3 °C)] 97 9 °F (36 6 °C)  HR:  [63-82] 77  Resp:  [18] 18  BP: (109-130)/(58-87) 109/60  SpO2:  [94 %-97 %] 94 %  Body mass index is 34 83 kg/m²       Intake/Output Summary (Last 24 hours) at 10/18/2019 1659  Gross per 24 hour   Intake 1450 ml   Output    Net 1450 ml     Regular House Diet    Scheduled Medications:   ciprofloxacin 500 mg Oral Q12H [address]   enoxaparin 40 mg Subcutaneous Daily   loratadine 10 mg Oral Daily   meclizine 25 mg Oral Q8H [address]   nicotine 1 patch Transdermal Daily   pantoprazole 40 mg Oral Daily   [START ON 10/20/2019] predniSONE 30 mg Oral Daily   Followed by      Manolo Hughes ON 10/22/2019] predniSONE 20 mg Oral Daily   Followed by      Manolo Hughes ON 10/24/2019] predniSONE 10 mg Oral Daily     Continuous IV Infusions:  sodium chloride 50 mL/hr Intravenous Continuous     PRN MEDS  acetaminophen 650 mg Oral Q6H PRN  GIVEN X 1/ 24 HRS   melatonin 3 mg Oral HS PRN   ondansetron 4 mg Intravenous Q6H PRN  GIVEN X 1/ 24 HRS     Discharge Plan:    [address] CLEARED  CASE MANAGEMENT FOLLOWING CLOSELY FOR ALL DISCHARGE NEEDS    Network Utilization Review Department  Phone: [phone]; Fax [phone]  [email or web address]  ATTENTION: Please call with any questions or concerns to [phone]  and carefully listen to the prompts so that you are directed to the right person  Send all requests for admission clinical reviews, approved or denied determinations and any other requests to fax [phone]   All voicemails are confidential

## 2019-10-19 NOTE — ASSESSMENT & PLAN NOTE
Patient reported exertional shortness of breath and leg swelling recently  Pulmonary exam showed clear lungs  Chest x-ray was normal  Serial troponins were negative  2D echo showed EF of 5% with mild concentric hypertrophy and grade 1 diastolic dysfunction without any significant valvular abnormality

## 2019-10-19 NOTE — ASSESSMENT & PLAN NOTE
Doubtful migraine as patient was not relieved with Toradol, Benadryl and regular  Likely withdrawal from nicotine and caffeine  Patient reported drinking 1 gallon of ice tea daily and patient also refused nicotine patch  CT scan and MRI of the brain were unremarkable

## 2019-10-19 NOTE — QUICK NOTE
Patient seen for complaints of migraine headache since yesterday afternoon  Patient states that he has a frontal headache that extends over his temporal region associated with nausea and photophobia  This feels similar to migraines he has had in the past   This is not a headache of sudden onset or the worst headache of his life  Neurological assessment unchanged  No relief with toradol, will add reglan and benadryl for the migraine cocktail  Patient had MRI which is pending, neurology is following

## 2019-10-19 NOTE — PLAN OF CARE
Problem: Potential for Falls  Goal: Patient will remain free of falls  Description  INTERVENTIONS:  - Assess patient frequently for physical needs  -  Identify cognitive and physical deficits and behaviors that affect risk of falls    -  [place] fall precautions as indicated by assessment   - Educate patient/family on patient safety including physical limitations  - Instruct patient to call for assistance with activity based on assessment  - Modify environment to reduce risk of injury  - Consider OT/PT consult to assist with strengthening/mobility  Outcome: Adequate for Discharge

## 2019-10-19 NOTE — PLAN OF CARE
Problem: Potential for Falls  Goal: Patient will remain free of falls  Description  INTERVENTIONS:  - Assess patient frequently for physical needs  -  Identify cognitive and physical deficits and behaviors that affect risk of falls    -  [place] fall precautions as indicated by assessment   - Educate patient/family on patient safety including physical limitations  - Instruct patient to call for assistance with activity based on assessment  - Modify environment to reduce risk of injury  - Consider OT/PT consult to assist with strengthening/mobility  Outcome: Progressing

## 2019-10-20 LAB
MRSA NOSE QL CULT: ABNORMAL
MRSA NOSE QL CULT: ABNORMAL

## 2019-11-12 ENCOUNTER — TELEPHONE (OUTPATIENT)
Dept: VASCULAR SURGERY | Facility: CLINIC | Age: 44
End: 2019-11-12

## 2019-11-12 NOTE — TELEPHONE ENCOUNTER
C [x] 11/13/19 4:30  S Major SHAKA Quinones [[id number]] [facility] [[id number]] RES REVIEW [[id number]]       Left message to advise appointment cancelled because he no showed to testing

## 2020-04-05 ENCOUNTER — APPOINTMENT (EMERGENCY)
Dept: RADIOLOGY | Facility: HOSPITAL | Age: 45
End: 2020-04-05
Payer: COMMERCIAL

## 2020-04-05 ENCOUNTER — HOSPITAL ENCOUNTER (EMERGENCY)
Facility: HOSPITAL | Age: 45
Discharge: HOME | End: 2020-04-05
Attending: EMERGENCY MEDICINE | Admitting: EMERGENCY MEDICINE
Payer: COMMERCIAL

## 2020-04-05 VITALS
OXYGEN SATURATION: 97 % | DIASTOLIC BLOOD PRESSURE: 84 MMHG | HEART RATE: 82 BPM | RESPIRATION RATE: 16 BRPM | BODY MASS INDEX: 35.9 KG/M2 | WEIGHT: 315 LBS | TEMPERATURE: 98.5 F | SYSTOLIC BLOOD PRESSURE: 137 MMHG

## 2020-04-05 DIAGNOSIS — M70.20 OLECRANON BURSITIS: Primary | ICD-10-CM

## 2020-04-05 PROCEDURE — 96372 THER/PROPH/DIAG INJ SC/IM: CPT

## 2020-04-05 PROCEDURE — 73080 X-RAY EXAM OF ELBOW: CPT

## 2020-04-05 PROCEDURE — 99283 EMERGENCY DEPT VISIT LOW MDM: CPT

## 2020-04-05 PROCEDURE — 99284 EMERGENCY DEPT VISIT MOD MDM: CPT | Performed by: PHYSICIAN ASSISTANT

## 2020-04-05 RX ORDER — TIZANIDINE HYDROCHLORIDE 4 MG/1
4 CAPSULE, GELATIN COATED ORAL AS NEEDED
COMMUNITY

## 2020-04-05 RX ORDER — KETOROLAC TROMETHAMINE 30 MG/ML
15 INJECTION, SOLUTION INTRAMUSCULAR; INTRAVENOUS ONCE
Status: COMPLETED | OUTPATIENT
Start: 2020-04-05 | End: 2020-04-05

## 2020-04-05 RX ORDER — COLCHICINE 0.6 MG/1
0.6 TABLET ORAL 2 TIMES DAILY
COMMUNITY

## 2020-04-05 RX ADMIN — KETOROLAC TROMETHAMINE 15 MG: 30 INJECTION, SOLUTION INTRAMUSCULAR at 15:19

## 2020-08-14 ENCOUNTER — APPOINTMENT (EMERGENCY)
Dept: RADIOLOGY | Facility: HOSPITAL | Age: 45
End: 2020-08-14
Payer: COMMERCIAL

## 2020-08-14 ENCOUNTER — HOSPITAL ENCOUNTER (EMERGENCY)
Facility: HOSPITAL | Age: 45
Discharge: HOME | End: 2020-08-14
Attending: EMERGENCY MEDICINE | Admitting: EMERGENCY MEDICINE
Payer: COMMERCIAL

## 2020-08-14 VITALS
HEART RATE: 87 BPM | BODY MASS INDEX: 36.76 KG/M2 | OXYGEN SATURATION: 96 % | RESPIRATION RATE: 20 BRPM | WEIGHT: 315 LBS | DIASTOLIC BLOOD PRESSURE: 73 MMHG | SYSTOLIC BLOOD PRESSURE: 137 MMHG | TEMPERATURE: 97.5 F

## 2020-08-14 DIAGNOSIS — M43.6 TORTICOLLIS: Primary | ICD-10-CM

## 2020-08-14 DIAGNOSIS — R51.9 HEADACHE: ICD-10-CM

## 2020-08-14 PROCEDURE — 99284 EMERGENCY DEPT VISIT MOD MDM: CPT | Performed by: PHYSICIAN ASSISTANT

## 2020-08-14 PROCEDURE — 70450 CT HEAD/BRAIN W/O DYE: CPT

## 2020-08-14 PROCEDURE — 99284 EMERGENCY DEPT VISIT MOD MDM: CPT

## 2020-08-14 PROCEDURE — G1004 CDSM NDSC: HCPCS

## 2020-08-14 PROCEDURE — 72125 CT NECK SPINE W/O DYE: CPT

## 2020-08-14 RX ORDER — DIAZEPAM 5 MG/1
5 TABLET ORAL ONCE
Status: COMPLETED | OUTPATIENT
Start: 2020-08-14 | End: 2020-08-14

## 2020-08-14 RX ORDER — DIAZEPAM 5 MG/1
5 TABLET ORAL 2 TIMES DAILY
Qty: 10 TABLET | Refills: 0 | Status: SHIPPED | OUTPATIENT
Start: 2020-08-14 | End: 2020-08-19

## 2020-08-14 RX ADMIN — DIAZEPAM 5 MG: 5 TABLET ORAL at 15:54

## 2020-08-14 NOTE — ED PROVIDER NOTES
History  Chief Complaint   Patient presents with    Neck Pain     states woke with neck pain a couple of days ago  since then getting worse  pain shooting up head behind eye and has headache     49-year-old male history of AFib, HTN, gout, GERD presents with neck pain x 3 days  He states that he woke up with a stiff neck a few days ago  He states that now when he moves his neck he states that the pain goes into his head and he feels like there is pain behind his left eye  He states his left eye is chronically red  He denies any eye pain or eye pain with movement  No eye drainage  No visual disturbances  He has been taking Motrin and Tylenol with minimal relief  He now has a headache  No cold symptoms  No fever or chills  No chest pain, difficulty breathing, shortness of breath  No difficulty ambulating  Prior to Admission Medications   Prescriptions Last Dose Informant Patient Reported? Taking?    Pantoprazole Sodium (PROTONIX PO)  Self Yes No   Sig: Take 40 mg by mouth daily    TiZANidine (ZANAFLEX) 4 MG capsule   Yes No   Sig: Take 4 mg by mouth as needed for muscle spasms   carisoprodol (SOMA) 350 mg tablet  Self Yes No   Sig: Take 350 mg by mouth 2 (two) times a day as needed for muscle spasms    colchicine (COLCRYS) 0 6 mg tablet   Yes No   Sig: Take 0 6 mg by mouth 2 (two) times a day   hydrochlorothiazide (HYDRODIURIL) 25 mg tablet   Yes No   Sig: Take 25 mg by mouth daily   indomethacin (INDOCIN) 50 mg capsule  Self Yes No   Sig: Take 50 mg by mouth 3 (three) times a day with meals   meclizine (ANTIVERT) 25 mg tablet   No No   Sig: Take 1 tablet (25 mg total) by mouth every 8 (eight) hours   Patient not taking: Reported on 4/5/2020   nicotine (NICODERM CQ) 14 mg/24hr TD 24 hr patch   No No   Sig: Place 1 patch on the skin daily   Patient not taking: Reported on 4/5/2020      Facility-Administered Medications: None       Past Medical History:   Diagnosis Date    [facility]  Chronic pain     back pain    GERD (gastroesophageal reflux disease)     Gout     Hypertension     Iliac artery dissection (H[facility])        Past Surgical History:   Procedure Laterality Date    ANKLE SURGERY Right     APPENDECTOMY      CHOLECYSTECTOMY      ELBOW SURGERY      KNEE ARTHROPLASTY         Family History   Problem Relation Age of Onset    Cancer Mother     No Known Problems Father      I have reviewed and agree with the history as documented  E-Cigarette/Vaping    E-Cigarette Use Never User      E-Cigarette/Vaping Substances     Social History     Tobacco Use    Smoking status: Never Smoker    Smokeless tobacco: Current User     Types: Chew   Substance Use Topics    Alcohol use: Never     Frequency: Never    Drug use: No       Review of Systems   Constitutional: Negative for chills and fever  HENT: Negative for sneezing and sore throat  Eyes: Negative for photophobia and visual disturbance  Respiratory: Negative for cough and shortness of breath  Cardiovascular: Negative for chest pain, palpitations and leg swelling  Gastrointestinal: Negative for abdominal pain, constipation, diarrhea, nausea and vomiting  Musculoskeletal: Positive for myalgias, neck pain and neck stiffness  Negative for back pain, gait problem and joint swelling  Skin: Negative for color change, pallor, rash and wound  Neurological: Positive for headaches  Negative for dizziness, syncope, weakness, light-headedness and numbness  All other systems reviewed and are negative  Physical Exam  Physical Exam  Vitals signs and nursing note reviewed  Constitutional:       General: He is not in acute distress  Appearance: Normal appearance  He is well-developed  He is not diaphoretic  HENT:      Head: Normocephalic and atraumatic        Right Ear: Tympanic membrane, ear canal and external ear normal       Left Ear: Tympanic membrane, ear canal and external ear normal       Nose: Nose normal  Eyes:      General: Lids are normal  Lids are everted, no foreign bodies appreciated  No scleral icterus  Right eye: No discharge  Left eye: No discharge  Extraocular Movements: Extraocular movements intact  Conjunctiva/sclera: Conjunctivae normal       Pupils: Pupils are equal, round, and reactive to light  Neck:      Musculoskeletal: Decreased range of motion  Neck rigidity, pain with movement and muscular tenderness present  No edema, erythema, crepitus, injury, torticollis or spinous process tenderness  Trachea: Trachea and phonation normal      Cardiovascular:      Rate and Rhythm: Normal rate and regular rhythm  Heart sounds: Normal heart sounds  No murmur  No friction rub  No gallop  Pulmonary:      Effort: Pulmonary effort is normal  No respiratory distress  Breath sounds: Normal breath sounds  No stridor  No wheezing or rales  Comments: Sp02 is 96% indicating adequate oxygenation on room air  Skin:     General: Skin is warm and dry  Capillary Refill: Capillary refill takes less than 2 seconds  Coloration: Skin is not pale  Findings: No erythema or rash  Neurological:      General: No focal deficit present  Mental Status: He is alert and oriented to person, place, and time  Mental status is at baseline  Cranial Nerves: No cranial nerve deficit  Sensory: No sensory deficit  Motor: No weakness        Coordination: Coordination normal       Gait: Gait normal          Vital Signs  ED Triage Vitals [08/14/20 1458]   Temperature Pulse Respirations Blood Pressure SpO2   97 5 °F (36 4 °C) 87 20 137/73 96 %      Temp Source Heart Rate Source Patient Position - Orthostatic VS BP Location FiO2 (%)   Tympanic Monitor Sitting Right arm --      Pain Score       8           Vitals:    08/14/20 1458   BP: 137/73   Pulse: 87   Patient Position - Orthostatic VS: Sitting         Visual Acuity      ED Medications  Medications   diazepam (VALIUM) tablet 5 mg (5 mg Oral Given 8/14/20 4878)       Diagnostic Studies  Results Reviewed     None                 CT head without contrast   Final Result by Deon Avendano MD (08/14 9406)      No acute intracranial abnormality  Workstation performed: [id number]         CT spine cervical without contrast   Final Result by Deon Avendano MD (08/14 4591)      No cervical spine fracture or traumatic malalignment  Workstation performed: [id number]                    Procedures  Procedures         ED Course       US AUDIT      Most Recent Value   Initial Alcohol Screen: US AUDIT-C    1  How often do you have a drink containing alcohol?  0 Filed at: 08/14/2020 1500   Audit-C Score  0 Filed at: 08/14/2020 1500                  COLLIN/DAST-10      Most Recent Value   How many times in the past year have you    Used an illegal drug or used a prescription medication for non-medical reasons? Never Filed at: 08/14/2020 1500                                MDM  Number of Diagnoses or Management Options  Headache:   Torticollis:   Diagnosis management comments: Patient well appearing, afebrile, neurologically intact with suspected torticollis vs neck muscle spasm vs tension headache  Advised rest, heating pads, ibuprofen prn pain along with valium for muscle spasm  Do not drive or operate heavy machinery while on this medication  Given ortho follow up  Gave patient proper education regarding diagnosis  Answered all questions  Return to ED for any worsening of symptoms otherwise follow up with primary care physician for re-evaluation  Discussed plan with patient who verbalized understanding and agreed to plan         Amount and/or Complexity of Data Reviewed  Tests in the radiology section of CPT®: reviewed and ordered  Discussion of test results with the performing providers: yes  Review and summarize past medical records: yes  Discuss the patient with other providers: yes  Independent visualization of images, tracings, or specimens: yes          Disposition  Final diagnoses:   Torticollis   Headache     Time reflects when diagnosis was documented in both MDM as applicable and the Disposition within this note     Time User Action Codes Description Comment    8/14/2020  4:15 PM Janina Kelsie Add [M43 6] Torticollis     8/14/2020  4:17 PM Sekou [address] Headache       ED Disposition     ED Disposition Condition Date/Time Comment    Discharge Stable Fri Aug 14, 2020  4:15 PM Kari Miltonon discharge to home/self care              Follow-up Information     Follow up With Specialties Details Why Contact Info Additional Information    Lala Melo MD Orthopedic Surgery Schedule an appointment as soon as possible for a visit in 3 days If symptoms worsen [address] [facility] [address] [facility]       [address] [facility] Emergency Medicine Go to  As needed [address] [facility] ED, [facility], [address]          Discharge Medication List as of 8/14/2020  4:18 PM      START taking these medications    Details   diazepam (VALIUM) 5 mg tablet Take 1 tablet (5 mg total) by mouth 2 (two) times a day for 5 days, Starting Fri 8/14/2020, Until Wed 8/19/2020, Print         CONTINUE these medications which have NOT CHANGED    Details   carisoprodol (SOMA) 350 mg tablet Take 350 mg by mouth 2 (two) times a day as needed for muscle spasms , Historical Med      colchicine (COLCRYS) 0 6 mg tablet Take 0 6 mg by mouth 2 (two) times a day, Historical Med      hydrochlorothiazide (HYDRODIURIL) 25 mg tablet Take 25 mg by mouth daily, Historical Med      indomethacin (INDOCIN) 50 mg capsule Take 50 mg by mouth 3 (three) times a day with meals, Historical Med      meclizine (ANTIVERT) 25 mg tablet Take 1 tablet (25 mg total) by mouth every 8 (eight) hours, Starting Sat 10/19/2019, Normal nicotine (NICODERM CQ) 14 mg/24hr TD 24 hr patch Place 1 patch on the skin daily, Starting Sun 10/20/2019, Normal      Pantoprazole Sodium (PROTONIX PO) Take 40 mg by mouth daily , Historical Med      TiZANidine (ZANAFLEX) 4 MG capsule Take 4 mg by mouth as needed for muscle spasms, Historical Med           No discharge procedures on file      PDMP Review     None          ED Provider  Electronically Signed by           Florin Martell PA-C  08/14/20 4664

## 2020-11-13 ENCOUNTER — LAB (OUTPATIENT)
Dept: LAB | Facility: HOSPITAL | Age: 45
End: 2020-11-13
Payer: COMMERCIAL

## 2020-11-13 ENCOUNTER — TRANSCRIBE ORDERS (OUTPATIENT)
Dept: ADMINISTRATIVE | Facility: HOSPITAL | Age: 45
End: 2020-11-13

## 2020-11-13 DIAGNOSIS — M25.461 SWELLING OF RIGHT KNEE JOINT: Primary | ICD-10-CM

## 2020-11-13 LAB
APPEARANCE FLD: ABNORMAL
COLOR FLD: YELLOW
CRYSTALS SNV QL MICRO: NORMAL
GLUCOSE FLD-MCNC: 45 MG/DL
HISTIOCYTES NFR SNV MANUAL: 2 %
LYMPHOCYTES # SNV MANUAL: 3 %
MONOCYTES NFR SNV MANUAL: 11 %
NEUTROPHILS NFR SNV MANUAL: 84 %
NEUTS BAND NFR SNV: 0 %
PROT FLD-MCNC: 3.4 G/DL
RBC # SNV MANUAL: 3000 /UL (ref 0–10)
SITE: ABNORMAL
TOTAL CELLS COUNTED SPEC: 100
WBC # FLD MANUAL: 6210 /UL

## 2020-11-13 PROCEDURE — 82945 GLUCOSE OTHER FLUID: CPT | Performed by: ORTHOPAEDIC SURGERY

## 2020-11-13 PROCEDURE — 87070 CULTURE OTHR SPECIMN AEROBIC: CPT | Performed by: ORTHOPAEDIC SURGERY

## 2020-11-13 PROCEDURE — 89060 EXAM SYNOVIAL FLUID CRYSTALS: CPT | Performed by: ORTHOPAEDIC SURGERY

## 2020-11-13 PROCEDURE — 84157 ASSAY OF PROTEIN OTHER: CPT | Performed by: ORTHOPAEDIC SURGERY

## 2020-11-13 PROCEDURE — 89051 BODY FLUID CELL COUNT: CPT | Performed by: ORTHOPAEDIC SURGERY

## 2020-11-13 PROCEDURE — 87476 LYME DIS DNA AMP PROBE: CPT | Performed by: ORTHOPAEDIC SURGERY

## 2020-11-13 PROCEDURE — 89050 BODY FLUID CELL COUNT: CPT | Performed by: ORTHOPAEDIC SURGERY

## 2020-11-13 PROCEDURE — 36415 COLL VENOUS BLD VENIPUNCTURE: CPT | Performed by: ORTHOPAEDIC SURGERY

## 2020-11-13 PROCEDURE — 87205 SMEAR GRAM STAIN: CPT | Performed by: ORTHOPAEDIC SURGERY

## 2020-11-16 LAB
BACTERIA SPEC BFLD CULT: NO GROWTH
GRAM STN SPEC: NORMAL
GRAM STN SPEC: NORMAL

## 2020-11-18 LAB — B BURGDOR DNA SPEC QL NAA+PROBE: NEGATIVE

## 2022-07-25 ENCOUNTER — HOSPITAL ENCOUNTER (EMERGENCY)
Facility: HOSPITAL | Age: 47
Discharge: HOME | End: 2022-07-26
Attending: EMERGENCY MEDICINE
Payer: COMMERCIAL

## 2022-07-25 ENCOUNTER — APPOINTMENT (EMERGENCY)
Dept: CT IMAGING | Facility: HOSPITAL | Age: 47
End: 2022-07-25
Payer: COMMERCIAL

## 2022-07-25 VITALS
OXYGEN SATURATION: 95 % | DIASTOLIC BLOOD PRESSURE: 72 MMHG | RESPIRATION RATE: 18 BRPM | HEART RATE: 78 BPM | SYSTOLIC BLOOD PRESSURE: 122 MMHG | TEMPERATURE: 98 F

## 2022-07-25 DIAGNOSIS — S19.9XXA INJURY OF NECK, INITIAL ENCOUNTER: Primary | ICD-10-CM

## 2022-07-25 LAB
ALBUMIN SERPL BCP-MCNC: 4.3 G/DL (ref 3.5–5)
ALP SERPL-CCNC: 70 U/L (ref 34–104)
ALT SERPL W P-5'-P-CCNC: 19 U/L (ref 7–52)
ANION GAP SERPL CALCULATED.3IONS-SCNC: 7 MMOL/L (ref 4–13)
AST SERPL W P-5'-P-CCNC: 24 U/L (ref 13–39)
BASOPHILS # BLD AUTO: 0.04 THOUSANDS/ΜL (ref 0–0.1)
BASOPHILS NFR BLD AUTO: 1 % (ref 0–1)
BILIRUB SERPL-MCNC: 0.7 MG/DL (ref 0.2–1)
BUN SERPL-MCNC: 15 MG/DL (ref 5–25)
CALCIUM SERPL-MCNC: 9 MG/DL (ref 8.4–10.2)
CHLORIDE SERPL-SCNC: 107 MMOL/L (ref 96–108)
CO2 SERPL-SCNC: 26 MMOL/L (ref 21–32)
CREAT SERPL-MCNC: 1.31 MG/DL (ref 0.6–1.3)
EOSINOPHIL # BLD AUTO: 0.05 THOUSAND/ΜL (ref 0–0.61)
EOSINOPHIL NFR BLD AUTO: 1 % (ref 0–6)
ERYTHROCYTE [DISTWIDTH] IN BLOOD BY AUTOMATED COUNT: 13.1 % (ref 11.6–15.1)
GFR SERPL CREATININE-BSD FRML MDRD: 63 ML/MIN/1.73SQ M
GLUCOSE SERPL-MCNC: 92 MG/DL (ref 65–140)
HCT VFR BLD AUTO: 42.4 % (ref 36.5–49.3)
HGB BLD-MCNC: 14.6 G/DL (ref 12–17)
IMM GRANULOCYTES # BLD AUTO: 0.01 THOUSAND/UL (ref 0–0.2)
IMM GRANULOCYTES NFR BLD AUTO: 0 % (ref 0–2)
LYMPHOCYTES # BLD AUTO: 2.06 THOUSANDS/ΜL (ref 0.6–4.47)
LYMPHOCYTES NFR BLD AUTO: 33 % (ref 14–44)
MCH RBC QN AUTO: 30.5 PG (ref 26.8–34.3)
MCHC RBC AUTO-ENTMCNC: 34.4 G/DL (ref 31.4–37.4)
MCV RBC AUTO: 89 FL (ref 82–98)
MONOCYTES # BLD AUTO: 0.52 THOUSAND/ΜL (ref 0.17–1.22)
MONOCYTES NFR BLD AUTO: 8 % (ref 4–12)
NEUTROPHILS # BLD AUTO: 3.61 THOUSANDS/ΜL (ref 1.85–7.62)
NEUTS SEG NFR BLD AUTO: 57 % (ref 43–75)
NRBC BLD AUTO-RTO: 0 /100 WBCS
PLATELET # BLD AUTO: 192 THOUSANDS/UL (ref 149–390)
PMV BLD AUTO: 10.8 FL (ref 8.9–12.7)
POTASSIUM SERPL-SCNC: 3.6 MMOL/L (ref 3.5–5.3)
PROT SERPL-MCNC: 6.6 G/DL (ref 6.4–8.4)
RBC # BLD AUTO: 4.78 MILLION/UL (ref 3.88–5.62)
SODIUM SERPL-SCNC: 140 MMOL/L (ref 135–147)
WBC # BLD AUTO: 6.29 THOUSAND/UL (ref 4.31–10.16)

## 2022-07-25 PROCEDURE — 80053 COMPREHEN METABOLIC PANEL: CPT

## 2022-07-25 PROCEDURE — 96375 TX/PRO/DX INJ NEW DRUG ADDON: CPT

## 2022-07-25 PROCEDURE — 36415 COLL VENOUS BLD VENIPUNCTURE: CPT

## 2022-07-25 PROCEDURE — 99284 EMERGENCY DEPT VISIT MOD MDM: CPT

## 2022-07-25 PROCEDURE — 70498 CT ANGIOGRAPHY NECK: CPT

## 2022-07-25 PROCEDURE — G1004 CDSM NDSC: HCPCS

## 2022-07-25 PROCEDURE — 85025 COMPLETE CBC W/AUTO DIFF WBC: CPT

## 2022-07-25 PROCEDURE — 96374 THER/PROPH/DIAG INJ IV PUSH: CPT

## 2022-07-25 RX ORDER — KETOROLAC TROMETHAMINE 30 MG/ML
15 INJECTION, SOLUTION INTRAMUSCULAR; INTRAVENOUS ONCE
Status: COMPLETED | OUTPATIENT
Start: 2022-07-25 | End: 2022-07-25

## 2022-07-25 RX ORDER — HYDROMORPHONE HCL/PF 1 MG/ML
0.5 SYRINGE (ML) INJECTION ONCE
Status: COMPLETED | OUTPATIENT
Start: 2022-07-25 | End: 2022-07-25

## 2022-07-25 RX ADMIN — KETOROLAC TROMETHAMINE 15 MG: 30 INJECTION, SOLUTION INTRAMUSCULAR at 22:21

## 2022-07-25 RX ADMIN — HYDROMORPHONE HYDROCHLORIDE 0.5 MG: 1 INJECTION, SOLUTION INTRAMUSCULAR; INTRAVENOUS; SUBCUTANEOUS at 22:22

## 2022-07-25 RX ADMIN — IOHEXOL 70 ML: 350 INJECTION, SOLUTION INTRAVENOUS at 23:16

## 2022-07-26 PROCEDURE — 99285 EMERGENCY DEPT VISIT HI MDM: CPT | Performed by: EMERGENCY MEDICINE
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
## 2022-07-26 NOTE — ED NOTES
Patient ambulatory to bathroom  Recommenced to use urinal with privacy curtain d/t c collar  Patient declined        Lashonda Argueta RN  07/25/22 9890
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
## 2022-07-26 NOTE — DISCHARGE INSTRUCTIONS
Take Tylenol and ibuprofen as needed, he can also apply lidocaine patches to the area  I recommend that he follow-up with your family doctor should you have continued pain    Please return to the emergency department should you develop a severe headache, worsening, fevers or chills, weakness or numbness in your arms or legs, difficulty swallowing or breathing, or any other concerning symptoms

## 2022-09-29 ENCOUNTER — EVALUATION (OUTPATIENT)
Dept: PHYSICAL THERAPY | Facility: CLINIC | Age: 47
End: 2022-09-29
Payer: COMMERCIAL

## 2022-09-29 DIAGNOSIS — Q76.49 CONGENITAL FUSION OF CERVICAL SPINE: ICD-10-CM

## 2022-09-29 DIAGNOSIS — M47.812 CERVICAL SPONDYLOSIS: Primary | ICD-10-CM

## 2022-09-29 PROCEDURE — 97162 PT EVAL MOD COMPLEX 30 MIN: CPT

## 2022-09-29 NOTE — LETTER
2022    Tanmay Olmstead, [address] [address]    Patient: Muriel Koch   YOB: 1975   Date of Visit: 2022     Encounter Diagnosis     ICD-10-CM    1  Cervical spondylosis  M47 812    2  Congenital fusion of cervical spine  Q76 49        Dear Dr Wilkins Code:    Thank you for your recent referral of Muriel Koch  Please review the attached evaluation summary from Severo's recent visit  Please verify that you agree with the plan of care by signing the attached order  If you have any questions or concerns, please do not hesitate to call  I sincerely appreciate the opportunity to share in the care of one of your patients and hope to have another opportunity to work with you in the near future  Sincerely,    Clifford Esquivel, PT      Referring Provider:      I certify that I have read the below Plan of Care and certify the need for these services furnished under this plan of treatment while under my care  Tanmay Olmstead MD  [address] [address]  [address]          PT Evaluation     Today's date: 2022  Patient name: Muriel Koch  : 1975  MRN: [id number]  Referring provider: James Neal MD  Dx:   Encounter Diagnosis     ICD-10-CM    1  Cervical spondylosis  M47 812    2  Congenital fusion of cervical spine  Q76 49        Start Time: 1745  Stop Time: 1830  Total time in clinic (min): 45 minutes    Assessment  Assessment details: Muriel Koch is a 48 y o  male who presents with cervical derangement with pain, decreased strength, decreased joint mobility, impaired sensation, postural dysfunction and severe restriction in ROM  Due to these impairments, patient has difficulty performing ADL's, work-related activities, engaging in social activities, lifting/carrying, reaching and sleeping   Patient's clinical presentation is consistent with their referring diagnosis of Cervical spondylosis (primary encounter diagnosis) and Congenital fusion of cervical spine, with significant soft tissue tightness limiting full assessment at this time  Patient has been educated in home exercise program and plan of care  Patient would benefit from skilled physical therapy services to address their aforementioned functional limitations and progress towards prior level of function and independence with home exercise program and self symptom management/resolution  Impairments: abnormal or restricted ROM, abnormal movement, activity intolerance, impaired physical strength, lacks appropriate home exercise program, pain with function, scapular dyskinesis, poor posture  and poor body mechanics  Other impairment: poor tolerance to prolonged static positions  Functional limitations: limited tolerance to driving, using computer, disturbed sleep  Symptom irritability: highUnderstanding of Dx/Px/POC: good   Prognosis: good    Goals  Short Term Goals: Target Date 10/27/22  1  Independent with initial HEP and moving toward self symptom reduction  2  Improve postural awareness and demonstrate self postural correction  3  Improve AROM cervical spine to mod limitation or better  4  Patient will report improved sleeping, with pt able to notice improved quality of sleep as well  Long Term Goals: Target Date 12/22/22  1  Indep with HEP and self symptom management/prevention  2  Achieve cervical AROM to min limitation w/o increased pain  3  Improve tolerance to [place] reaching and carrying, and use of computer to return to functional and household tasks that he has had difficulty performing  4  Able to sleep through the night without interruption  5  Able to rotate head nearly equally to left and right for improved safety and mobility while driving         Plan  Patient would benefit from: skilled PT and PT eval  Planned modality interventions: thermotherapy: hydrocollator packs and unattended electrical stimulation  Planned therapy interventions: joint mobilization, manual therapy, patient education, postural training, activity modification, body mechanics training, home exercise program, neuromuscular re-education, strengthening, stretching, therapeutic activities, therapeutic exercise, functional ROM exercises and flexibility  Other planned therapy interventions: mechanical assessment  Frequency: 2x week  Duration in weeks: 12  Plan of Care beginning date: 2022  Plan of Care expiration date: 2022  Treatment plan discussed with: patient        Subjective Evaluation    History of Present Illness  Onset date: Neck pain present for years, worsening over the past several years  Mechanism of injury: trauma  Mechanism of injury: Patient reports having neck and shoulder pain for "many years", with worsening in the past several years 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  Patient notes pain was just in his neck and began to worsen, causing numbness in both hands at times, which caused him to become more concerned and call his orthopedist Dr Bere Phan for an appointment  Patient referred to PT at this time, after MRI showed   Pain  Current pain ratin  At best pain ratin  At worst pain ratin  Location: bilateral neck from base of skull to between shoulder blades   Quality: dull ache and tight (pins and needles down to hands when at work, sitting up from lying down, reaching overhead)  Relieving factors: change in position and heat  Aggravating factors: lifting, overhead activity and sitting  Symptom course: some mild improvements in pain since prednisone  Social Support  Steps to enter house: yes  Stairs in house: yes   Patient lives at: second floor    Lives with: spouse and adult children (grandchild lives with them also)    Employment status: working ()  Hand dominance: right  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, works physical job  Life stress: pain for several years,       Diagnostic Tests  X-ray: abnormal  MRI studies: abnormal  Treatments  Previous treatment: medication  Current treatment: physical therapy  Patient Goals  Patient goals for therapy: decreased pain, increased strength, return to sport/leisure activities and increased motion          Objective  Cervical ROM:   Flexion mod limitation  Extension mod-severe limitation  Rotation L mod limitation, R severe limitation  Sidebend L: severe limitation, R: severe limitation    Repeated movement testing:   Cervical retraction: Increased, no worse after 10x, in sitting  significant stiffness in neck made performance difficult  Trialed in supine with continued suboccipital tightness causing pain and difficulty completing  Cervical protraction: not assessed      Palpation:   9/29/22: Significant pain with palpation over paraspinals, upper traps, SCM, suboccipital muscles, levator scapula, and rhomboids, with patient noted to respond fairly well to gentle traction and suboccipital release, with improvement in pain levels noted  Posture:   Patient presents with significant postural impairments, in part due to his height causing most chairs to be a poor support for him  Rounded shoulders, protracted cervical spine, decreased lumbar lordosis, increased neck flexion all demonstrated, with pain and muscular tightness causing significant limitation  Following manual stretching, patient notes he feels more able to correct posture and with decreased pain  Strength: To be assessed as appropriate in neck and shoulders, as patient is tolerating mobility more          Precautions: lumbar, PMHx of knee, elbow, and ankle injuries, concussion    Daily Exercise Log:    Date 9/29/22        Visit # 1                 Manual         Suboccipital release Dec upper c'spine pain after        Manual traction No sig change after        STM to neck/post shld                                             Neuro Re-Ed         Postural correction         SNAG rotation         Slouch correct                                             Ther Ex         Supine cervical rotation         Supine chin tuck         Supine sidebend to R 2x10, no sig effect                                                     Ther Activity                           Gait Training                           Modalities         Mechanical traction         MHP + Estim         HEP:   9/29/22: Verbally instructed in postural correction, gentle cervical ROM and decreasing shoulder elevation

## 2022-09-29 NOTE — PROGRESS NOTES
PT Evaluation     Today's date: 2022  Patient name: Danae Bowden  : 1975  MRN: [id number]  Referring provider: Mana Baltazar MD  Dx:   Encounter Diagnosis     ICD-10-CM    1  Cervical spondylosis  M47 812    2  Congenital fusion of cervical spine  Q76 49        Start Time: 1745  Stop Time: 1830  Total time in clinic (min): 45 minutes    Assessment  Assessment details: Danae Bowden is a 48 y o  male who presents with cervical derangement with pain, decreased strength, decreased joint mobility, impaired sensation, postural dysfunction and severe restriction in ROM  Due to these impairments, patient has difficulty performing ADL's, work-related activities, engaging in social activities, lifting/carrying, reaching and sleeping  Patient's clinical presentation is consistent with their referring diagnosis of Cervical spondylosis  (primary encounter diagnosis) and Congenital fusion of cervical spine, with significant soft tissue tightness limiting full assessment at this time  Patient has been educated in home exercise program and plan of care  Patient would benefit from skilled physical therapy services to address their aforementioned functional limitations and progress towards prior level of function and independence with home exercise program and self symptom management/resolution  Impairments: abnormal or restricted ROM, abnormal movement, activity intolerance, impaired physical strength, lacks appropriate home exercise program, pain with function, scapular dyskinesis, poor posture  and poor body mechanics  Other impairment: poor tolerance to prolonged static positions  Functional limitations: limited tolerance to driving, using computer, disturbed sleep  Symptom irritability: highUnderstanding of Dx/Px/POC: good   Prognosis: good    Goals  Short Term Goals: Target Date 10/27/22  1  Independent with initial HEP and moving toward self symptom reduction    2  Improve postural awareness and demonstrate self postural correction  3  Improve AROM cervical spine to mod limitation or better  4  Patient will report improved sleeping, with pt able to notice improved quality of sleep as well  Long Term Goals: Target Date 12/22/22  1  Indep with HEP and self symptom management/prevention  2  Achieve cervical AROM to min limitation w/o increased pain  3  Improve tolerance to [place] reaching and carrying, and use of computer to return to functional and household tasks that he has had difficulty performing  4  Able to sleep through the night without interruption  5  Able to rotate head nearly equally to left and right for improved safety and mobility while driving  Plan  Patient would benefit from: skilled PT and PT eval  Planned modality interventions: thermotherapy: hydrocollator packs and unattended electrical stimulation  Planned therapy interventions: joint mobilization, manual therapy, patient education, postural training, activity modification, body mechanics training, home exercise program, neuromuscular re-education, strengthening, stretching, therapeutic activities, therapeutic exercise, functional ROM exercises and flexibility  Other planned therapy interventions: mechanical assessment  Frequency: 2x week  Duration in weeks: 12  Plan of Care beginning date: 9/29/2022  Plan of Care expiration date: 12/22/2022  Treatment plan discussed with: patient        Subjective Evaluation    History of Present Illness  Onset date: Neck pain present for years, worsening over the past several years  Mechanism of injury: trauma  Mechanism of injury: Patient reports having neck and shoulder pain for "many years", with worsening in the past several years 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   Patient notes pain was just in his neck and began to worsen, causing numbness in both hands at times, which caused him to become more concerned and call his orthopedist Dr Milly Goldmann for an appointment  Patient referred to PT at this time, after MRI showed   Pain  Current pain ratin  At best pain ratin  At worst pain ratin  Location: bilateral neck from base of skull to between shoulder blades   Quality: dull ache and tight (pins and needles down to hands when at work, sitting up from lying down, reaching overhead)  Relieving factors: change in position and heat  Aggravating factors: lifting, overhead activity and sitting  Symptom course: some mild improvements in pain since prednisone  Social Support  Steps to enter house: yes  Stairs in house: yes   Patient lives at: second floor  Lives with: spouse and adult children (grandchild lives with them also)    Employment status: working ()  Hand dominance: right  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, works physical job  Life stress: pain for several years,       Diagnostic Tests  X-ray: abnormal  MRI studies: abnormal  Treatments  Previous treatment: medication  Current treatment: physical therapy  Patient Goals  Patient goals for therapy: decreased pain, increased strength, return to sport/leisure activities and increased motion          Objective  Cervical ROM:   Flexion mod limitation  Extension mod-severe limitation  Rotation L mod limitation, R severe limitation  Sidebend L: severe limitation, R: severe limitation    Repeated movement testing:   Cervical retraction: Increased, no worse after 10x, in sitting  significant stiffness in neck made performance difficult  Trialed in supine with continued suboccipital tightness causing pain and difficulty completing  Cervical protraction: not assessed      Palpation:   22: Significant pain with palpation over paraspinals, upper traps, SCM, suboccipital muscles, levator scapula, and rhomboids, with patient noted to respond fairly well to gentle traction and suboccipital release, with improvement in pain levels noted       Posture: Patient presents with significant postural impairments, in part due to his height causing most chairs to be a poor support for him  Rounded shoulders, protracted cervical spine, decreased lumbar lordosis, increased neck flexion all demonstrated, with pain and muscular tightness causing significant limitation  Following manual stretching, patient notes he feels more able to correct posture and with decreased pain  Strength: To be assessed as appropriate in neck and shoulders, as patient is tolerating mobility more  Precautions: lumbar, PMHx of knee, elbow, and ankle injuries, concussion    Daily Exercise Log:    Date 9/29/22        Visit # 1                 Manual         Suboccipital release Dec upper c'spine pain after        Manual traction No sig change after        STM to neck/post shld                                             Neuro Re-Ed         Postural correction         SNAG rotation         Slouch correct                                             Ther Ex         Supine cervical rotation         Supine chin tuck         Supine sidebend to R 2x10, no sig effect                                                     Ther Activity                           Gait Training                           Modalities         Mechanical traction         MHP + Estim         HEP:   9/29/22: Verbally instructed in postural correction, gentle cervical ROM and decreasing shoulder elevation

## 2022-10-03 ENCOUNTER — OFFICE VISIT (OUTPATIENT)
Dept: PHYSICAL THERAPY | Facility: CLINIC | Age: 47
End: 2022-10-03
Payer: COMMERCIAL

## 2022-10-03 ENCOUNTER — TELEPHONE (OUTPATIENT)
Dept: PHYSICAL THERAPY | Facility: CLINIC | Age: 47
End: 2022-10-03

## 2022-10-03 DIAGNOSIS — M47.812 CERVICAL SPONDYLOSIS: Primary | ICD-10-CM

## 2022-10-03 DIAGNOSIS — Q76.49 CONGENITAL FUSION OF CERVICAL SPINE: ICD-10-CM

## 2022-10-03 PROCEDURE — 97110 THERAPEUTIC EXERCISES: CPT

## 2022-10-03 PROCEDURE — 97140 MANUAL THERAPY 1/> REGIONS: CPT

## 2022-10-03 NOTE — TELEPHONE ENCOUNTER
Patient called after he did not attend his scheduled appointment at 5:45 pm  No answer, with message left on voicemail, letting him know there is a 6:30 opening tonight if he is able to come then or to call us back  Await further contact from patient  Will reach out again if no response to voicemail      Elle Gutierrez PT, MS, NCS  [facility] Neurologic Certified Specialist  [place] Licence #[id number]

## 2022-10-03 NOTE — PROGRESS NOTES
Daily Note     Today's date: 10/3/2022  Patient name: Jamison Cornejo  : 1975  MRN: [id number]  Referring provider: Carson Manriquez MD  Dx:   Encounter Diagnosis     ICD-10-CM    1  Cervical spondylosis  M47 812    2  Congenital fusion of cervical spine  Q76 49                   Subjective: Patient got days mixed up  Responded to phone call and came in for appointment opening  Patient noted he had increased pain and soreness since PT evaluation, with pt noting his spine "tightened up" right away when he laid down at home that night  Finished prescribed steroid today  Motivated for recovery, but frustrated with pain levels  Noted high pitched ringing in his ears when attempting cervical retraction  Objective: See treatment diary below      Assessment: Tolerated treatment fairly, with continued pain and stiffness noted but mild improvements in AROM of cervical spine  Patient tolerated movements with improved ease of movement following MHP + estim to start session  Patient exhibited good technique with therapeutic exercises and would benefit from continued PT  Good understanding noted by patient on importance of improving his active mobility to be able to assess him further  Plan: Continue per plan of care  Progress treatment as tolerated  continue to assess response to varied treatments        Precautions: lumbar, PMHx of knee, elbow, and ankle injuries, concussion    Daily Exercise Log:    Date 9/29/22 10/3/22       Visit # 1 2                Manual  8'       Suboccipital release Dec upper c'spine pain after gentle       Manual traction No sig change after        STM to neck/post shld  Gentle                                            Neuro Re-Ed  5'       Postural correction         SNAG rotation  Man traction/ guidance 3x5       Slouch correct  1x10                                           Ther Ex  20'       Supine cervical rotation  2x5 to L side, no change in R, painful throughout Supine sidebend to R 2x10, no sig effect Gentle L/R sidebend supine & sit 2x8       Wall slide  1x10 5" hold       Supine shld ER/IR  2x10                                  Ther Activity                           Gait Training                           Modalities         Mechanical traction         MHP + Estim  10' in semi-reclined position        HEP:   Access Code: [email or web address]  URL: [email or web address]  Date: 10/03/2022  Prepared by: Deisy Whitman    Exercises  · Standing shoulder flexion wall slides - 7 x weekly - 1 sets - 10 reps - 10 second hold  · Seated Scapular Retraction - 7 x weekly - 1-2 sets - 10 reps  · Neck Sidebending - 7 x weekly - 1-2 sets - 10 reps  · Standing Shoulder External and Internal Rotation AROM - 1 x daily - 7 x weekly - 2 sets - 10 reps

## 2022-10-06 ENCOUNTER — OFFICE VISIT (OUTPATIENT)
Dept: PHYSICAL THERAPY | Facility: CLINIC | Age: 47
End: 2022-10-06
Payer: COMMERCIAL

## 2022-10-06 DIAGNOSIS — M47.812 CERVICAL SPONDYLOSIS: Primary | ICD-10-CM

## 2022-10-06 DIAGNOSIS — Q76.49 CONGENITAL FUSION OF CERVICAL SPINE: ICD-10-CM

## 2022-10-06 PROCEDURE — 97014 ELECTRIC STIMULATION THERAPY: CPT

## 2022-10-06 PROCEDURE — G0283 ELEC STIM OTHER THAN WOUND: HCPCS

## 2022-10-06 PROCEDURE — 97140 MANUAL THERAPY 1/> REGIONS: CPT

## 2022-10-06 PROCEDURE — 97110 THERAPEUTIC EXERCISES: CPT

## 2022-10-06 NOTE — PROGRESS NOTES
Daily Note     Today's date: 10/6/2022  Patient name: Cat Spear  : 1975  MRN: [id number]  Referring provider: Ann-Marie Mendoza MD  Dx:   Encounter Diagnosis     ICD-10-CM    1  Cervical spondylosis  M47 812    2  Congenital fusion of cervical spine  Q76 49                   Subjective: Patient reporting pain is about the same  Tightened up after Monday's session, but not as bad as previously  Minimally improved ROM noted at start of session  Pain rated 7/10 today at start of session  Objective: See treatment diary below      Assessment: Tolerated treatment well and with significantly improved R cervical rotation noted following repeated L sidebending with manual and self-OP at upper traps  Continued pain levels about the same, but with improved neck mobility noted  Improving tolerance for mobility following estim + MHP to start session at this time, resulting in improved ability to perform mechanical assessment  Patient exhibited good technique with therapeutic exercises and would benefit from continued PT and progression of ROM and activity as able  Instructed in self-OP for left side bending and instructed to complete intermittently throughout the day as able  Good understanding and willingness demonstrated  Plan: Continue per plan of care  Progress treatment as tolerated         Precautions: lumbar, PMHx of knee, elbow, and ankle injuries, concussion    Daily Exercise Log:    Date 9/29/22 10/3/22 10/6/22      Visit # 1 2 3               Manual  8' 8'      Suboccipital release Dec upper c'spine pain after gentle Gentle 4' semi reclined      Manual traction No sig change after        STM to neck/post shld  Gentle  Gentle 4'                                          Neuro Re-Ed  5'       Postural correction         SNAG rotation  Man traction/ guidance 3x5       Slouch correct  1x10                                           Ther Ex  20' 20'      Supine cervical rotation  2x5 to L side, no change in R, painful throughout       Supine sidebend To R 2x10, no sig effect Gentle L/R sidebend supine & sit 2x8 Seated sidebend to L w/ man OP 2x6, self OP w/ SOS 2x5 w/ increased R cerv rotation noted after      Wall slide  1x10 5" hold       Supine shld ER/IR  2x10 2x10 w/ inc ROM today      Supine shld flx    1x5, inc tightness noted  Ther Activity                           Gait Training                           Modalities   15'      Mechanical traction         MHP + Estim  10' in semi-reclined position  12' in semi-reclined, upper traps x4      HEP:   Access Code: [email or web address]: [email or web address]  Date: 10/03/2022  Prepared by: Michaela Rosario    Exercises  · Standing shoulder flexion wall slides - 7 x weekly - 1 sets - 10 reps - 10 second hold  · Seated Scapular Retraction - 7 x weekly - 1-2 sets - 10 reps  · Neck Sidebending - 7 x weekly - 1-2 sets - 10 reps  · Standing Shoulder External and Internal Rotation AROM - 1 x daily - 7 x weekly - 2 sets - 10 reps    10/6/22: Instructed in sidebending to L side only and hold other exercises  Will follow-up at session next week

## 2022-10-10 ENCOUNTER — OFFICE VISIT (OUTPATIENT)
Dept: PHYSICAL THERAPY | Facility: CLINIC | Age: 47
End: 2022-10-10
Payer: COMMERCIAL

## 2022-10-10 DIAGNOSIS — Q76.49 CONGENITAL FUSION OF CERVICAL SPINE: ICD-10-CM

## 2022-10-10 DIAGNOSIS — M47.812 CERVICAL SPONDYLOSIS: Primary | ICD-10-CM

## 2022-10-10 PROCEDURE — 97014 ELECTRIC STIMULATION THERAPY: CPT

## 2022-10-10 PROCEDURE — 97110 THERAPEUTIC EXERCISES: CPT

## 2022-10-10 PROCEDURE — G0283 ELEC STIM OTHER THAN WOUND: HCPCS

## 2022-10-10 PROCEDURE — 97140 MANUAL THERAPY 1/> REGIONS: CPT

## 2022-10-11 NOTE — PROGRESS NOTES
Daily Note     Today's date: 10/10/2022  Patient name: Raphael Moran  : 1975  MRN: [id number]  Referring provider: Bridget Mccarty MD  Dx:   Encounter Diagnosis     ICD-10-CM    1  Cervical spondylosis  M47 812    2  Congenital fusion of cervical spine  Q76 49        Start Time: 1745  Stop Time: 1830  Total time in clinic (min): 45 minutes    Subjective: Patient arrives reporting some improved right cervical rotation preserved using left side-bending activities with belt OP at home  Notes continued high levels of pain 7/10 on arrival  Continues to work 7 days a week, with pt reporting he thinks he might not have to work weekends after this past weekend  Objective: See treatment diary below      Assessment: Tolerated treatment well and with improved cervical ROM and decreased pain following IASTM to L side of neck/upper traps  Noted to have gradual improvements, with pain, muscle tightness and extended period of immobility impacting patient's rate of progress  Following up with orthopedic next Monday, and will determine if he will continue further with PT at that time  Patient exhibited good technique with therapeutic exercises and would benefit from continued PT      Plan: Continue per plan of care  Sees MD for follow-up appointment next week on Monday  Precautions: lumbar, PMHx of knee, elbow, and ankle injuries, concussion    Daily Exercise Log:    Date 9/29/22 10/3/22 10/6/22 10/10/22     Visit # 1 2 3 4              Manual  8' 8' 15'     Suboccipital release Dec upper c'spine pain after gentle Gentle 4' semi reclined Gentle, semi reclined     Manual traction No sig change after   With suboccipital release     STM to neck/post shld  Gentle  Gentle 4' IASTM to L side of neck along scalenes, upper traps and levator scap in semi-reclined position  Inc ROM, dec pain after                                           Neuro Re-Ed  5'       Postural correction         SNAG rotation  Man traction/ guidance 3x5  2x5 w/ man traction/OP seated & supine     Slouch correct  1x10                                           Ther Ex  20' 20' 15'     Supine cervical rotation  2x5 to L side, no change in R, painful throughout  2x5 to both sides, slow movements within tolerance     Supine sidebend To R 2x10, no sig effect Gentle L/R sidebend supine & sit 2x8 Seated sidebend to L w/ man OP 2x6, self OP w/ SOS 2x5 w/ increased R cerv rotation noted after 2x10 w/ man A, OP semi-reclined to L side  1x10 w/ SOS self-OP     Wall slide  1x10 5" hold       Supine shld ER/IR  2x10 2x10 w/ inc ROM today      Supine shld flx    1x5, inc tightness noted  Ther Activity                           Gait Training                           Modalities   15' 15'     Mechanical traction         MHP + Estim  10' in semi-reclined position  12' in semi-reclined, upper traps x4 10' + 5' set-up semi-reclined, 4 pads on neck     HEP:   Access Code: Jose Gloria  URL: [email or web address]  Date: 10/03/2022  Prepared by: Phyllis Nunn    Exercises  · Standing shoulder flexion wall slides - 7 x weekly - 1 sets - 10 reps - 10 second hold  · Seated Scapular Retraction - 7 x weekly - 1-2 sets - 10 reps  · Neck Sidebending - 7 x weekly - 1-2 sets - 10 reps  · Standing Shoulder External and Internal Rotation AROM - 1 x daily - 7 x weekly - 2 sets - 10 reps    10/6/22: Instructed in sidebending to L side only and hold other exercises  Will follow-up at session next week

## 2022-10-13 ENCOUNTER — OFFICE VISIT (OUTPATIENT)
Dept: PHYSICAL THERAPY | Facility: CLINIC | Age: 47
End: 2022-10-13
Payer: COMMERCIAL

## 2022-10-13 DIAGNOSIS — Q76.49 CONGENITAL FUSION OF CERVICAL SPINE: ICD-10-CM

## 2022-10-13 DIAGNOSIS — M47.812 CERVICAL SPONDYLOSIS: Primary | ICD-10-CM

## 2022-10-13 PROCEDURE — 97140 MANUAL THERAPY 1/> REGIONS: CPT

## 2022-10-13 PROCEDURE — 97110 THERAPEUTIC EXERCISES: CPT

## 2022-10-13 PROCEDURE — 97012 MECHANICAL TRACTION THERAPY: CPT

## 2022-10-13 NOTE — PROGRESS NOTES
Daily Note     Today's date: 10/13/2022  Patient name: Leeroy Zuniga  : 1975  MRN: [id number]  Referring provider: Linda Aleman MD  Dx:   Encounter Diagnosis     ICD-10-CM    1  Cervical spondylosis  M47 812    2  Congenital fusion of cervical spine  Q76 49                   Subjective: pt reports today is a very bad day bc of the pain, the last few days have been tolerable but it hurts  Reports the ROM seems to have improved in his neck a little with R rotations but the discomfort is still the same  Temporary relief with PT but increases later that night  Objective: See treatment diary below      Assessment: Tolerated treatment fair  Pt dem mild increases in R supported cerv rotations post mechanical traction, other wise pt cont to have high symptom irritability with cerv ROM at this time  Patient would benefit from continued PT      Plan: Continue per plan of care  Pt to see ortho on Monday for next course of action, holding PT until he sees what he says  Precautions: lumbar, PMHx of knee, elbow, and ankle injuries, concussion    Daily Exercise Log:    Date 9/29/22 10/3/22 10/6/22 10/10/22 10/13/2022    Visit # 1 2 3 4 5 FOTO              Manual  8' 8' 15' 15'     Suboccipital release Dec upper c'spine pain after gentle Gentle 4' semi reclined Gentle, semi reclined RR    Manual traction No sig change after   With suboccipital release     STM to neck/post shld  Gentle  Gentle 4' IASTM to L side of neck along scalenes, upper traps and levator scap in semi-reclined position  Inc ROM, dec pain after   L cerv/UT seated                                         Neuro Re-Ed  5'       Postural correction         SNAG rotation  Man traction/ guidance 3x5  2x5 w/ man traction/OP seated & supine     Slouch correct  1x10                                           Ther Ex  20' 20' 15' 15'     Supine cervical rotation  2x5 to L side, no change in R, painful throughout  2x5 to both sides, slow movements within tolerance 2x5 R/L     Supine sidebend To R 2x10, no sig effect Gentle L/R sidebend supine & sit 2x8 Seated sidebend to L w/ man OP 2x6, self OP w/ SOS 2x5 w/ increased R cerv rotation noted after 2x10 w/ man A, OP semi-reclined to L side  1x10 w/ SOS self-OP Seated w/ strap OP 1x10     Wall slide  1x10 5" hold       Supine shld ER/IR  2x10 2x10 w/ inc ROM today      Supine shld flx    1x5, inc tightness noted  Ther Activity                           Gait Training                           Modalities   15' 15' 10'     Mechanical traction     5'x2 25#     MHP + Estim  10' in semi-reclined position  12' in semi-reclined, upper traps x4 10' + 5' set-up semi-reclined, 4 pads on neck     HEP:   Access Code: Hazel Salas  URL: [email or web address]  Date: 10/03/2022  Prepared by: Kylee Schmidt    Exercises  · Standing shoulder flexion wall slides - 7 x weekly - 1 sets - 10 reps - 10 second hold  · Seated Scapular Retraction - 7 x weekly - 1-2 sets - 10 reps  · Neck Sidebending - 7 x weekly - 1-2 sets - 10 reps  · Standing Shoulder External and Internal Rotation AROM - 1 x daily - 7 x weekly - 2 sets - 10 reps    10/6/22: Instructed in sidebending to L side only and hold other exercises  Will follow-up at session next week

## 2022-10-24 ENCOUNTER — TELEPHONE (OUTPATIENT)
Dept: PHYSICAL THERAPY | Facility: CLINIC | Age: 47
End: 2022-10-24

## 2023-05-17 ENCOUNTER — CLINICAL SUPPORT (OUTPATIENT)
Dept: URGENT CARE | Facility: CLINIC | Age: 48
End: 2023-05-17

## 2023-05-17 ENCOUNTER — TRANSCRIBE ORDERS (OUTPATIENT)
Dept: URGENT CARE | Facility: CLINIC | Age: 48
End: 2023-05-17

## 2023-05-17 ENCOUNTER — APPOINTMENT (OUTPATIENT)
Dept: RADIOLOGY | Facility: CLINIC | Age: 48
End: 2023-05-17

## 2023-05-17 DIAGNOSIS — Z02.1 PHYSICAL EXAM, PRE-EMPLOYMENT: Primary | ICD-10-CM

## 2023-05-17 DIAGNOSIS — Z02.1 PHYSICAL EXAM, PRE-EMPLOYMENT: ICD-10-CM

## 2023-05-17 LAB
ALBUMIN SERPL BCP-MCNC: 3.8 G/DL (ref 3.5–5)
ALP SERPL-CCNC: 72 U/L (ref 46–116)
ALT SERPL W P-5'-P-CCNC: 33 U/L (ref 12–78)
ANION GAP SERPL CALCULATED.3IONS-SCNC: 1 MMOL/L (ref 4–13)
AST SERPL W P-5'-P-CCNC: 21 U/L (ref 5–45)
BASOPHILS # BLD AUTO: 0.04 THOUSANDS/ÂΜL (ref 0–0.1)
BASOPHILS NFR BLD AUTO: 1 % (ref 0–1)
BILIRUB SERPL-MCNC: 0.46 MG/DL (ref 0.2–1)
BUN SERPL-MCNC: 18 MG/DL (ref 5–25)
CALCIUM SERPL-MCNC: 8.6 MG/DL (ref 8.3–10.1)
CHLORIDE SERPL-SCNC: 111 MMOL/L (ref 96–108)
CHOLEST SERPL-MCNC: 184 MG/DL
CO2 SERPL-SCNC: 26 MMOL/L (ref 21–32)
CREAT SERPL-MCNC: 1.17 MG/DL (ref 0.6–1.3)
EOSINOPHIL # BLD AUTO: 0.08 THOUSAND/ÂΜL (ref 0–0.61)
EOSINOPHIL NFR BLD AUTO: 2 % (ref 0–6)
ERYTHROCYTE [DISTWIDTH] IN BLOOD BY AUTOMATED COUNT: 13.1 % (ref 11.6–15.1)
GFR SERPL CREATININE-BSD FRML MDRD: 72 ML/MIN/1.73SQ M
GLUCOSE SERPL-MCNC: 95 MG/DL (ref 65–140)
HCT VFR BLD AUTO: 44.5 % (ref 36.5–49.3)
HDLC SERPL-MCNC: 60 MG/DL
HGB BLD-MCNC: 14.8 G/DL (ref 12–17)
IMM GRANULOCYTES # BLD AUTO: 0.01 THOUSAND/UL (ref 0–0.2)
IMM GRANULOCYTES NFR BLD AUTO: 0 % (ref 0–2)
LDLC SERPL CALC-MCNC: 107 MG/DL (ref 0–100)
LYMPHOCYTES # BLD AUTO: 1.55 THOUSANDS/ÂΜL (ref 0.6–4.47)
LYMPHOCYTES NFR BLD AUTO: 35 % (ref 14–44)
MCH RBC QN AUTO: 30.8 PG (ref 26.8–34.3)
MCHC RBC AUTO-ENTMCNC: 33.3 G/DL (ref 31.4–37.4)
MCV RBC AUTO: 93 FL (ref 82–98)
MONOCYTES # BLD AUTO: 0.4 THOUSAND/ÂΜL (ref 0.17–1.22)
MONOCYTES NFR BLD AUTO: 9 % (ref 4–12)
NEUTROPHILS # BLD AUTO: 2.37 THOUSANDS/ÂΜL (ref 1.85–7.62)
NEUTS SEG NFR BLD AUTO: 53 % (ref 43–75)
NONHDLC SERPL-MCNC: 124 MG/DL
NRBC BLD AUTO-RTO: 0 /100 WBCS
PLATELET # BLD AUTO: 204 THOUSANDS/UL (ref 149–390)
PMV BLD AUTO: 11.5 FL (ref 8.9–12.7)
POTASSIUM SERPL-SCNC: 3.9 MMOL/L (ref 3.5–5.3)
PROT SERPL-MCNC: 6.9 G/DL (ref 6.4–8.4)
RBC # BLD AUTO: 4.8 MILLION/UL (ref 3.88–5.62)
SODIUM SERPL-SCNC: 138 MMOL/L (ref 135–147)
TRIGL SERPL-MCNC: 86 MG/DL
WBC # BLD AUTO: 4.45 THOUSAND/UL (ref 4.31–10.16)

## 2023-05-17 PROCEDURE — 93005 ELECTROCARDIOGRAM TRACING: CPT

## 2023-05-18 LAB
ATRIAL RATE: 60 BPM
P AXIS: 15 DEGREES
PR INTERVAL: 148 MS
QRS AXIS: 11 DEGREES
QRSD INTERVAL: 90 MS
QT INTERVAL: 444 MS
QTC INTERVAL: 444 MS
T WAVE AXIS: 24 DEGREES
VENTRICULAR RATE: 60 BPM

## 2023-05-18 PROCEDURE — 93010 ELECTROCARDIOGRAM REPORT: CPT

## 2023-10-24 ENCOUNTER — APPOINTMENT (EMERGENCY)
Dept: RADIOLOGY | Facility: HOSPITAL | Age: 48
End: 2023-10-24
Payer: COMMERCIAL

## 2023-10-24 ENCOUNTER — HOSPITAL ENCOUNTER (EMERGENCY)
Facility: HOSPITAL | Age: 48
Discharge: HOME | End: 2023-10-24
Attending: EMERGENCY MEDICINE
Payer: COMMERCIAL

## 2023-10-24 ENCOUNTER — APPOINTMENT (EMERGENCY)
Dept: ULTRASOUND IMAGING | Facility: HOSPITAL | Age: 48
End: 2023-10-24
Payer: COMMERCIAL

## 2023-10-24 VITALS
TEMPERATURE: 97.8 F | HEART RATE: 64 BPM | DIASTOLIC BLOOD PRESSURE: 82 MMHG | SYSTOLIC BLOOD PRESSURE: 119 MMHG | RESPIRATION RATE: 18 BRPM | OXYGEN SATURATION: 96 %

## 2023-10-24 DIAGNOSIS — R10.11 RIGHT UPPER QUADRANT ABDOMINAL PAIN: Primary | ICD-10-CM

## 2023-10-24 LAB
ALBUMIN SERPL BCP-MCNC: 4.1 G/DL (ref 3.5–5)
ALP SERPL-CCNC: 64 U/L (ref 34–104)
ALT SERPL W P-5'-P-CCNC: 13 U/L (ref 7–52)
ANION GAP SERPL CALCULATED.3IONS-SCNC: 5 MMOL/L
AST SERPL W P-5'-P-CCNC: 14 U/L (ref 13–39)
ATRIAL RATE: 68 BPM
BASOPHILS # BLD AUTO: 0.03 THOUSANDS/ÂΜL (ref 0–0.1)
BASOPHILS NFR BLD AUTO: 1 % (ref 0–1)
BILIRUB SERPL-MCNC: 0.32 MG/DL (ref 0.2–1)
BUN SERPL-MCNC: 10 MG/DL (ref 5–25)
CALCIUM SERPL-MCNC: 8.5 MG/DL (ref 8.4–10.2)
CHLORIDE SERPL-SCNC: 111 MMOL/L (ref 96–108)
CO2 SERPL-SCNC: 25 MMOL/L (ref 21–32)
CREAT SERPL-MCNC: 1.07 MG/DL (ref 0.6–1.3)
D DIMER PPP FEU-MCNC: <0.27 UG/ML FEU
EOSINOPHIL # BLD AUTO: 0.09 THOUSAND/ÂΜL (ref 0–0.61)
EOSINOPHIL NFR BLD AUTO: 2 % (ref 0–6)
ERYTHROCYTE [DISTWIDTH] IN BLOOD BY AUTOMATED COUNT: 12.8 % (ref 11.6–15.1)
GFR SERPL CREATININE-BSD FRML MDRD: 81 ML/MIN/1.73SQ M
GLUCOSE SERPL-MCNC: 94 MG/DL (ref 65–140)
HCT VFR BLD AUTO: 42.6 % (ref 36.5–49.3)
HGB BLD-MCNC: 14.1 G/DL (ref 12–17)
IMM GRANULOCYTES # BLD AUTO: 0 THOUSAND/UL (ref 0–0.2)
IMM GRANULOCYTES NFR BLD AUTO: 0 % (ref 0–2)
LIPASE SERPL-CCNC: 30 U/L (ref 11–82)
LYMPHOCYTES # BLD AUTO: 2.03 THOUSANDS/ÂΜL (ref 0.6–4.47)
LYMPHOCYTES NFR BLD AUTO: 51 % (ref 14–44)
MCH RBC QN AUTO: 29.9 PG (ref 26.8–34.3)
MCHC RBC AUTO-ENTMCNC: 33.1 G/DL (ref 31.4–37.4)
MCV RBC AUTO: 90 FL (ref 82–98)
MONOCYTES # BLD AUTO: 0.34 THOUSAND/ÂΜL (ref 0.17–1.22)
MONOCYTES NFR BLD AUTO: 8 % (ref 4–12)
NEUTROPHILS # BLD AUTO: 1.54 THOUSANDS/ÂΜL (ref 1.85–7.62)
NEUTS SEG NFR BLD AUTO: 38 % (ref 43–75)
NRBC BLD AUTO-RTO: 0 /100 WBCS
P AXIS: 38 DEGREES
PLATELET # BLD AUTO: 168 THOUSANDS/UL (ref 149–390)
PMV BLD AUTO: 10.9 FL (ref 8.9–12.7)
POTASSIUM SERPL-SCNC: 3.8 MMOL/L (ref 3.5–5.3)
PR INTERVAL: 162 MS
PROT SERPL-MCNC: 6.5 G/DL (ref 6.4–8.4)
QRS AXIS: 82 DEGREES
QRSD INTERVAL: 90 MS
QT INTERVAL: 452 MS
QTC INTERVAL: 480 MS
RBC # BLD AUTO: 4.72 MILLION/UL (ref 3.88–5.62)
SODIUM SERPL-SCNC: 141 MMOL/L (ref 135–147)
T WAVE AXIS: 14 DEGREES
VENTRICULAR RATE: 68 BPM
WBC # BLD AUTO: 4.03 THOUSAND/UL (ref 4.31–10.16)

## 2023-10-24 PROCEDURE — 96376 TX/PRO/DX INJ SAME DRUG ADON: CPT

## 2023-10-24 PROCEDURE — 99284 EMERGENCY DEPT VISIT MOD MDM: CPT

## 2023-10-24 PROCEDURE — 36415 COLL VENOUS BLD VENIPUNCTURE: CPT

## 2023-10-24 PROCEDURE — 96374 THER/PROPH/DIAG INJ IV PUSH: CPT

## 2023-10-24 PROCEDURE — 96375 TX/PRO/DX INJ NEW DRUG ADDON: CPT

## 2023-10-24 PROCEDURE — 80053 COMPREHEN METABOLIC PANEL: CPT | Performed by: EMERGENCY MEDICINE

## 2023-10-24 PROCEDURE — 83690 ASSAY OF LIPASE: CPT | Performed by: EMERGENCY MEDICINE

## 2023-10-24 PROCEDURE — 99285 EMERGENCY DEPT VISIT HI MDM: CPT | Performed by: EMERGENCY MEDICINE

## 2023-10-24 PROCEDURE — 71045 X-RAY EXAM CHEST 1 VIEW: CPT

## 2023-10-24 PROCEDURE — 93005 ELECTROCARDIOGRAM TRACING: CPT

## 2023-10-24 PROCEDURE — 93010 ELECTROCARDIOGRAM REPORT: CPT | Performed by: INTERNAL MEDICINE

## 2023-10-24 PROCEDURE — 76705 ECHO EXAM OF ABDOMEN: CPT

## 2023-10-24 PROCEDURE — 85025 COMPLETE CBC W/AUTO DIFF WBC: CPT | Performed by: EMERGENCY MEDICINE

## 2023-10-24 PROCEDURE — 85379 FIBRIN DEGRADATION QUANT: CPT | Performed by: EMERGENCY MEDICINE

## 2023-10-24 RX ORDER — KETOROLAC TROMETHAMINE 30 MG/ML
15 INJECTION, SOLUTION INTRAMUSCULAR; INTRAVENOUS ONCE
Status: COMPLETED | OUTPATIENT
Start: 2023-10-24 | End: 2023-10-24

## 2023-10-24 RX ORDER — HYDROMORPHONE HCL/PF 1 MG/ML
1 SYRINGE (ML) INJECTION ONCE
Status: COMPLETED | OUTPATIENT
Start: 2023-10-24 | End: 2023-10-24

## 2023-10-24 RX ADMIN — HYDROMORPHONE HYDROCHLORIDE 1 MG: 1 INJECTION, SOLUTION INTRAMUSCULAR; INTRAVENOUS; SUBCUTANEOUS at 16:39

## 2023-10-24 RX ADMIN — KETOROLAC TROMETHAMINE 15 MG: 30 INJECTION, SOLUTION INTRAMUSCULAR; INTRAVENOUS at 15:23

## 2023-10-24 RX ADMIN — HYDROMORPHONE HYDROCHLORIDE 1 MG: 1 INJECTION, SOLUTION INTRAMUSCULAR; INTRAVENOUS; SUBCUTANEOUS at 18:08

## 2023-10-24 NOTE — ED PROVIDER NOTES
History  Chief Complaint   Patient presents with    Abdominal Pain     Pt to ED with c/o abdominal pain and nausea x2 months. Presents with worsening pain      Areli Camacho is a 50 y.o. male who presents with the chief complaint of recurrent ruq abdominal pain. He has been evaluated at [facility] and had a CT dissection study, ruq ultrasound and other investigations without clear diagnosis for pain. Patient has a remote history of cholecystectomy. His other medical history is significant for paroxysmal atrial fibrillation, iliac artery dissection, hypertension and chronic back pain. History provided by:  Patient and medical records   used: No    Abdominal Pain  Associated symptoms: nausea and vomiting    Associated symptoms: no chest pain, no chills, no diarrhea, no dysuria, no fever and no shortness of breath        Prior to Admission Medications   Prescriptions Last Dose Informant Patient Reported? Taking?    Pantoprazole Sodium (PROTONIX PO)  Self Yes No   Sig: Take 40 mg by mouth daily    TiZANidine (ZANAFLEX) 4 MG capsule   Yes No   Sig: Take 4 mg by mouth as needed for muscle spasms   carisoprodol (SOMA) 350 mg tablet  Self Yes No   Sig: Take 350 mg by mouth 2 (two) times a day as needed for muscle spasms    colchicine (COLCRYS) 0.6 mg tablet   Yes No   Sig: Take 0.6 mg by mouth 2 (two) times a day   diazepam (VALIUM) 5 mg tablet   No No   Sig: Take 1 tablet (5 mg total) by mouth 2 (two) times a day for 5 days   hydrochlorothiazide (HYDRODIURIL) 25 mg tablet   Yes No   Sig: Take 25 mg by mouth daily   indomethacin (INDOCIN) 50 mg capsule  Self Yes No   Sig: Take 50 mg by mouth 3 (three) times a day with meals   meclizine (ANTIVERT) 25 mg tablet   No No   Sig: Take 1 tablet (25 mg total) by mouth every 8 (eight) hours   Patient not taking: Reported on 4/5/2020   nicotine (NICODERM CQ) 14 mg/24hr TD 24 hr patch   No No   Sig: Place 1 patch on the skin daily   Patient not taking: Reported on 4/5/2020      Facility-Administered Medications: None       Past Medical History:   Diagnosis Date    Atrial fibrillation (HCC)     Chronic pain     back pain    GERD (gastroesophageal reflux disease)     Gout     Hypertension     Iliac artery dissection (HCC)        Past Surgical History:   Procedure Laterality Date    ANKLE SURGERY Right     APPENDECTOMY      CHOLECYSTECTOMY      ELBOW SURGERY      KNEE ARTHROPLASTY         Family History   Problem Relation Age of Onset    Cancer Mother     No Known Problems Father      I have reviewed and agree with the history as documented. E-Cigarette/Vaping    E-Cigarette Use Never User      E-Cigarette/Vaping Substances     Social History     Tobacco Use    Smoking status: Never    Smokeless tobacco: Current     Types: Chew   Vaping Use    Vaping Use: Never used   Substance Use Topics    Alcohol use: Never    Drug use: No       Review of Systems   Constitutional:  Negative for chills, diaphoresis and fever. Respiratory:  Negative for shortness of breath. Cardiovascular:  Negative for chest pain and palpitations. Gastrointestinal:  Positive for abdominal pain, nausea and vomiting. Negative for diarrhea. Genitourinary:  Negative for dysuria and frequency. Skin:  Negative for rash. All other systems reviewed and are negative. Physical Exam  Physical Exam  Vitals and nursing note reviewed. Constitutional:       General: He is in acute distress. Appearance: He is well-developed. HENT:      Head: Normocephalic and atraumatic. Eyes:      Pupils: Pupils are equal, round, and reactive to light. Neck:      Vascular: No JVD. Cardiovascular:      Rate and Rhythm: Normal rate and regular rhythm. Heart sounds: Normal heart sounds. No murmur heard. No friction rub. No gallop. Pulmonary:      Effort: Pulmonary effort is normal. No respiratory distress. Breath sounds: Normal breath sounds. No wheezing or rales.    Chest:      Chest wall: No tenderness. Abdominal:      Tenderness: There is abdominal tenderness in the right upper quadrant. Musculoskeletal:         General: No tenderness. Normal range of motion. Cervical back: Normal range of motion. Skin:     General: Skin is warm and dry. Neurological:      General: No focal deficit present. Mental Status: He is alert and oriented to person, place, and time. Psychiatric:         Behavior: Behavior normal.         Thought Content:  Thought content normal.         Judgment: Judgment normal.         Vital Signs  ED Triage Vitals   Temperature Pulse Respirations Blood Pressure SpO2   10/24/23 1326 10/24/23 1326 10/24/23 1326 10/24/23 1326 10/24/23 1326   97.8 °F (36.6 °C) 67 18 115/66 97 %      Temp src Heart Rate Source Patient Position - Orthostatic VS BP Location FiO2 (%)   -- 10/24/23 1515 10/24/23 1515 10/24/23 1515 --    Monitor Sitting Right arm       Pain Score       10/24/23 1523       9           Vitals:    10/24/23 1326 10/24/23 1515 10/24/23 1630   BP: 115/66 115/58 119/82   Pulse: 67 64 64   Patient Position - Orthostatic VS:  Sitting Sitting         Visual Acuity      ED Medications  Medications   ketorolac (TORADOL) injection 15 mg (15 mg Intravenous Given 10/24/23 1523)   HYDROmorphone (DILAUDID) injection 1 mg (1 mg Intravenous Given 10/24/23 1639)   HYDROmorphone (DILAUDID) injection 1 mg (1 mg Intravenous Given 10/24/23 1808)       Diagnostic Studies  Results Reviewed       Procedure Component Value Units Date/Time    D-dimer, quantitative [[id number]]  (Normal) Collected: 10/24/23 1518    Lab Status: Final result Specimen: Blood from Arm, Left Updated: 10/24/23 1542     D-Dimer, Quant <0.27 ug/ml FEU     Comprehensive metabolic panel [[id number]]  (Abnormal) Collected: 10/24/23 1333    Lab Status: Final result Specimen: Blood from Arm, Left Updated: 10/24/23 1409     Sodium 141 mmol/L      Potassium 3.8 mmol/L      Chloride 111 mmol/L      CO2 25 mmol/L      ANION GAP 5 mmol/L      BUN 10 mg/dL      Creatinine 1.07 mg/dL      Glucose 94 mg/dL      Calcium 8.5 mg/dL      AST 14 U/L      ALT 13 U/L      Alkaline Phosphatase 64 U/L      Total Protein 6.5 g/dL      Albumin 4.1 g/dL      Total Bilirubin 0.32 mg/dL      eGFR 81 ml/min/1.73sq m     Narrative:      [facility] guidelines for Chronic Kidney Disease (CKD):     Stage 1 with normal or high GFR (GFR > 90 mL/min/1.73 square meters)    Stage 2 Mild CKD (GFR = 60-89 mL/min/1.73 square meters)    Stage 3A Moderate CKD (GFR = 45-59 mL/min/1.73 square meters)    Stage 3B Moderate CKD (GFR = 30-44 mL/min/1.73 square meters)    Stage 4 Severe CKD (GFR = 15-29 mL/min/1.73 square meters)    Stage 5 End Stage CKD (GFR <15 mL/min/1.73 square meters)  Note: GFR calculation is accurate only with a steady state creatinine    Lipase [[id number]]  (Normal) Collected: 10/24/23 1333    Lab Status: Final result Specimen: Blood from Arm, Left Updated: 10/24/23 1409     Lipase 30 u/L     CBC and differential [[id number]]  (Abnormal) Collected: 10/24/23 1333    Lab Status: Final result Specimen: Blood from Arm, Left Updated: 10/24/23 1342     WBC 4.03 Thousand/uL      RBC 4.72 Million/uL      Hemoglobin 14.1 g/dL      Hematocrit 42.6 %      MCV 90 fL      MCH 29.9 pg      MCHC 33.1 g/dL      RDW 12.8 %      MPV 10.9 fL      Platelets 568 Thousands/uL      nRBC 0 /100 WBCs      Neutrophils Relative 38 %      Immat GRANS % 0 %      Lymphocytes Relative 51 %      Monocytes Relative 8 %      Eosinophils Relative 2 %      Basophils Relative 1 %      Neutrophils Absolute 1.54 Thousands/µL      Immature Grans Absolute 0.00 Thousand/uL      Lymphocytes Absolute 2.03 Thousands/µL      Monocytes Absolute 0.34 Thousand/µL      Eosinophils Absolute 0.09 Thousand/µL      Basophils Absolute 0.03 Thousands/µL                    US right upper quadrant   Final Result by Valentina Benavidez MD (10/24 1733)      Limited exam due to bowel gas artifact. Cholecystectomy. Hepatic steatosis. Workstation performed: [id number]         XR chest 1 view portable   ED Interpretation by Hugo Benoit MD (10/24 0592)   This film was interpreted independently by me. No infiltrate, cardiac silhouette normal, no pleural effusions or pulmonary edema. Procedures  ECG 12 Lead Documentation Only    Date/Time: 10/24/2023 3:34 PM    Performed by: Hugo Benoit MD  Authorized by: Hugo Benoit MD    Indications / Diagnosis:  Ruq abdominal pain  ECG reviewed by me, the ED Provider: yes    Patient location:  ED  Previous ECG:     Previous ECG:  Compared to current    Comparison ECG info:  05/17/2023    Similarity:  No change  Interpretation:     Interpretation: normal    Rate:     ECG rate:  68    ECG rate assessment: normal    Rhythm:     Rhythm: sinus rhythm    Ectopy:     Ectopy: none    QRS:     QRS axis:  Normal    QRS intervals:  Normal  Conduction:     Conduction: normal    ST segments:     ST segments:  Normal           ED Course                                             Medical Decision Making  Background: 50 y.o. male presents with recurrent ruq abdominal pain    Differential DX includes but is not limited to: retained biliary stone, pancreatitis, less likely lower lobe pneumonia, less likely PE, chronic pain syndrome, hepatitis    Plan: cbc, cmp, lipase, ddimer, EKG, chest xray, ruq ultrasound, symptom control       Amount and/or Complexity of Data Reviewed  Labs: ordered. Radiology: ordered and independent interpretation performed. Risk  Prescription drug management.              Disposition  Final diagnoses:   Right upper quadrant abdominal pain     Time reflects when diagnosis was documented in both MDM as applicable and the Disposition within this note       Time User Action Codes Description Comment    10/24/2023  5:42 PM Aranza Garcia Add [R10.11] Right upper quadrant abdominal pain           ED Disposition ED Disposition   Discharge    Condition   Stable    Date/Time   Tue Oct 24, 2023  5:42 PM    Comment   Allan Martinez discharge to home/self care. Follow-up Information       Follow up With Specialties Details Why Contact Info Additional [address] [facility] Schedule an appointment as soon as possible for a visit in 1 week  [address] [facility]  Real [address] [id number] [facility], [address] [facility], [address] [place] [address], [facility], [place], [facility]            Discharge Medication List as of 10/24/2023  5:57 PM        CONTINUE these medications which have NOT CHANGED    Details   carisoprodol (SOMA) 350 mg tablet Take 350 mg by mouth 2 (two) times a day as needed for muscle spasms , Historical Med      colchicine (COLCRYS) 0.6 mg tablet Take 0.6 mg by mouth 2 (two) times a day, Historical Med      diazepam (VALIUM) 5 mg tablet Take 1 tablet (5 mg total) by mouth 2 (two) times a day for 5 days, Starting Fri 8/14/2020, Until Wed 8/19/2020, Print      hydrochlorothiazide (HYDRODIURIL) 25 mg tablet Take 25 mg by mouth daily, Historical Med      indomethacin (INDOCIN) 50 mg capsule Take 50 mg by mouth 3 (three) times a day with meals, Historical Med      meclizine (ANTIVERT) 25 mg tablet Take 1 tablet (25 mg total) by mouth every 8 (eight) hours, Starting Sat 10/19/2019, Normal      nicotine (NICODERM CQ) 14 mg/24hr TD 24 hr patch Place 1 patch on the skin daily, Starting Sun 10/20/2019, Normal      Pantoprazole Sodium (PROTONIX PO) Take 40 mg by mouth daily , Historical Med      TiZANidine (ZANAFLEX) 4 MG capsule Take 4 mg by mouth as needed for muscle spasms, Historical Med             No discharge procedures on file.     PDMP Review       None            ED Provider  Electronically Signed by             Gaylin Schwab, MD  10/24/23 4709

## 2023-10-24 NOTE — Clinical Note
Sonja Salinas was seen and treated in our emergency department on 10/24/2023. No restrictions            Diagnosis:     Natividad Fairbanks  may return to work on return date. He may return on this date: 10/25/2023         If you have any questions or concerns, please don't hesitate to call.       Piyush Wynne RN    ______________________________           _______________          _______________  Hospital Representative                              Date                                Time

## 2024-04-30 NOTE — ED NOTES
Addended by: CHUCHO WRIGHT on: 4/30/2024 02:07 PM     Modules accepted: Orders     Awaiting results of vascular study in no apparent distress, DILIP Hua, TANIA  07/27/18 7054

## 2025-02-28 ENCOUNTER — OCCMED (OUTPATIENT)
Dept: URGENT CARE | Facility: CLINIC | Age: 50
End: 2025-02-28

## 2025-02-28 ENCOUNTER — APPOINTMENT (OUTPATIENT)
Dept: LAB | Facility: CLINIC | Age: 50
End: 2025-02-28

## 2025-02-28 DIAGNOSIS — Z02.89 ENCOUNTER FOR PHYSICAL EXAMINATION RELATED TO EMPLOYMENT: Primary | ICD-10-CM

## 2025-02-28 DIAGNOSIS — Z02.89 ENCOUNTER FOR PHYSICAL EXAMINATION RELATED TO EMPLOYMENT: ICD-10-CM

## 2025-02-28 LAB
ALBUMIN SERPL BCG-MCNC: 4 G/DL (ref 3.5–5)
ALP SERPL-CCNC: 68 U/L (ref 34–104)
ALT SERPL W P-5'-P-CCNC: 18 U/L (ref 7–52)
ANION GAP SERPL CALCULATED.3IONS-SCNC: 5 MMOL/L (ref 4–13)
AST SERPL W P-5'-P-CCNC: 17 U/L (ref 13–39)
BASOPHILS # BLD AUTO: 0.04 THOUSANDS/ÂΜL (ref 0–0.1)
BASOPHILS NFR BLD AUTO: 1 % (ref 0–1)
BILIRUB SERPL-MCNC: 0.67 MG/DL (ref 0.2–1)
BUN SERPL-MCNC: 11 MG/DL (ref 5–25)
CALCIUM SERPL-MCNC: 9 MG/DL (ref 8.4–10.2)
CHLORIDE SERPL-SCNC: 108 MMOL/L (ref 96–108)
CHOLEST SERPL-MCNC: 154 MG/DL (ref ?–200)
CO2 SERPL-SCNC: 30 MMOL/L (ref 21–32)
CREAT SERPL-MCNC: 0.99 MG/DL (ref 0.6–1.3)
EOSINOPHIL # BLD AUTO: 0.08 THOUSAND/ÂΜL (ref 0–0.61)
EOSINOPHIL NFR BLD AUTO: 2 % (ref 0–6)
ERYTHROCYTE [DISTWIDTH] IN BLOOD BY AUTOMATED COUNT: 13.3 % (ref 11.6–15.1)
GFR SERPL CREATININE-BSD FRML MDRD: 87 ML/MIN/1.73SQ M
GLUCOSE P FAST SERPL-MCNC: 85 MG/DL (ref 65–99)
HCT VFR BLD AUTO: 45.4 % (ref 36.5–49.3)
HDLC SERPL-MCNC: 51 MG/DL
HGB BLD-MCNC: 14.7 G/DL (ref 12–17)
IMM GRANULOCYTES # BLD AUTO: 0.02 THOUSAND/UL (ref 0–0.2)
IMM GRANULOCYTES NFR BLD AUTO: 1 % (ref 0–2)
LDLC SERPL CALC-MCNC: 85 MG/DL (ref 0–100)
LYMPHOCYTES # BLD AUTO: 1.52 THOUSANDS/ÂΜL (ref 0.6–4.47)
LYMPHOCYTES NFR BLD AUTO: 37 % (ref 14–44)
MCH RBC QN AUTO: 30.1 PG (ref 26.8–34.3)
MCHC RBC AUTO-ENTMCNC: 32.4 G/DL (ref 31.4–37.4)
MCV RBC AUTO: 93 FL (ref 82–98)
MONOCYTES # BLD AUTO: 0.37 THOUSAND/ÂΜL (ref 0.17–1.22)
MONOCYTES NFR BLD AUTO: 9 % (ref 4–12)
NEUTROPHILS # BLD AUTO: 2.06 THOUSANDS/ÂΜL (ref 1.85–7.62)
NEUTS SEG NFR BLD AUTO: 50 % (ref 43–75)
NONHDLC SERPL-MCNC: 103 MG/DL
NRBC BLD AUTO-RTO: 0 /100 WBCS
PLATELET # BLD AUTO: 185 THOUSANDS/UL (ref 149–390)
PMV BLD AUTO: 11.2 FL (ref 8.9–12.7)
POTASSIUM SERPL-SCNC: 4.2 MMOL/L (ref 3.5–5.3)
PROT SERPL-MCNC: 6.4 G/DL (ref 6.4–8.4)
RBC # BLD AUTO: 4.88 MILLION/UL (ref 3.88–5.62)
SODIUM SERPL-SCNC: 143 MMOL/L (ref 135–147)
TRIGL SERPL-MCNC: 88 MG/DL (ref ?–150)
WBC # BLD AUTO: 4.09 THOUSAND/UL (ref 4.31–10.16)

## 2025-02-28 PROCEDURE — 80061 LIPID PANEL: CPT

## 2025-02-28 PROCEDURE — 36415 COLL VENOUS BLD VENIPUNCTURE: CPT

## 2025-02-28 PROCEDURE — 85025 COMPLETE CBC W/AUTO DIFF WBC: CPT

## 2025-02-28 PROCEDURE — 80053 COMPREHEN METABOLIC PANEL: CPT

## 2025-03-07 LAB
ATRIAL RATE: 54 BPM
P AXIS: 56 DEGREES
PR INTERVAL: 142 MS
QRS AXIS: 1 DEGREES
QRSD INTERVAL: 82 MS
QT INTERVAL: 442 MS
QTC INTERVAL: 419 MS
T WAVE AXIS: 13 DEGREES
VENTRICULAR RATE: 54 BPM

## 2025-03-07 PROCEDURE — 93010 ELECTROCARDIOGRAM REPORT: CPT | Performed by: INTERNAL MEDICINE
